# Patient Record
Sex: MALE | Race: WHITE | NOT HISPANIC OR LATINO | Employment: FULL TIME | URBAN - METROPOLITAN AREA
[De-identification: names, ages, dates, MRNs, and addresses within clinical notes are randomized per-mention and may not be internally consistent; named-entity substitution may affect disease eponyms.]

---

## 2018-03-29 ENCOUNTER — OFFICE VISIT (OUTPATIENT)
Dept: FAMILY MEDICINE CLINIC | Facility: CLINIC | Age: 38
End: 2018-03-29
Payer: COMMERCIAL

## 2018-03-29 ENCOUNTER — TRANSCRIBE ORDERS (OUTPATIENT)
Dept: FAMILY MEDICINE CLINIC | Facility: CLINIC | Age: 38
End: 2018-03-29

## 2018-03-29 VITALS
DIASTOLIC BLOOD PRESSURE: 70 MMHG | RESPIRATION RATE: 16 BRPM | BODY MASS INDEX: 32.34 KG/M2 | SYSTOLIC BLOOD PRESSURE: 130 MMHG | HEIGHT: 71 IN | HEART RATE: 80 BPM | TEMPERATURE: 98.1 F | WEIGHT: 231 LBS

## 2018-03-29 DIAGNOSIS — R05.9 COUGH: ICD-10-CM

## 2018-03-29 DIAGNOSIS — F41.9 ANXIETY: ICD-10-CM

## 2018-03-29 DIAGNOSIS — J32.9 SINUSITIS, UNSPECIFIED CHRONICITY, UNSPECIFIED LOCATION: Primary | ICD-10-CM

## 2018-03-29 PROCEDURE — 99203 OFFICE O/P NEW LOW 30 MIN: CPT | Performed by: NURSE PRACTITIONER

## 2018-03-29 RX ORDER — ALPRAZOLAM 0.5 MG/1
TABLET ORAL
Qty: 10 TABLET | Refills: 0 | Status: SHIPPED | OUTPATIENT
Start: 2018-03-29 | End: 2018-05-09 | Stop reason: SDUPTHER

## 2018-03-29 RX ORDER — ALBUTEROL SULFATE 90 UG/1
2 AEROSOL, METERED RESPIRATORY (INHALATION) EVERY 6 HOURS PRN
Qty: 1 INHALER | Refills: 0 | Status: SHIPPED | OUTPATIENT
Start: 2018-03-29 | End: 2018-05-09

## 2018-03-29 RX ORDER — DOXYCYCLINE 100 MG/1
100 TABLET ORAL 2 TIMES DAILY
Qty: 20 TABLET | Refills: 0 | Status: SHIPPED | OUTPATIENT
Start: 2018-03-29 | End: 2018-04-08

## 2018-03-29 RX ORDER — FLUTICASONE PROPIONATE 50 MCG
1 SPRAY, SUSPENSION (ML) NASAL DAILY
Qty: 16 G | Refills: 0 | Status: SHIPPED | OUTPATIENT
Start: 2018-03-29 | End: 2018-05-09

## 2018-03-29 NOTE — PATIENT INSTRUCTIONS
Fluids  Rest  Nasal saline  Supportive care for symptom management  Flonase as directed  Doxycyline 100mg twice daily for 10 days  Hycodan syrup as needed for cough  Rescue inhaler , 2 puffs every 4-6 hours as needed for shortness breath, chest tightness, bronchospasm, coughing fits  Xanax 0 5mg daily as needed  Additional prescriptions will be managed by PCP  Symptoms may persist for 7-10 days

## 2018-03-29 NOTE — PROGRESS NOTES
Assessment/Plan:      1  Sinusitis, unspecified chronicity, unspecified location  Fluids  Rest  Nasal saline  Supportive care for symptom management  - doxycycline (ADOXA) 100 MG tablet; Take 1 tablet (100 mg total) by mouth 2 (two) times a day for 10 days  Dispense: 20 tablet; Refill: 0  - fluticasone (FLONASE) 50 mcg/act nasal spray; 1 spray into each nostril daily  Dispense: 16 g; Refill: 0    2  Cough  May be secondary to pnd vs post viral reactive cough  - HYDROcodone-homatropine (HYCODAN) 5-1 5 mg/5 mL syrup; Take 5 mL by mouth 4 (four) times a day as needed for cough Max Daily Amount: 20 mL  Dispense: 120 mL; Refill: 0  - albuterol (PROVENTIL HFA,VENTOLIN HFA) 90 mcg/act inhaler; Inhale 2 puffs every 6 (six) hours as needed for wheezing  Dispense: 1 Inhaler; Refill: 0    3  Anxiety  Advised that further rx will be managed by pcp  Pt verbalized understanding    - ALPRAZolam (XANAX) 0 5 mg tablet; Daily prn  Dispense: 10 tablet; Refill: 0      Subjective:  I have reviewed past medical history, surgical history, medications, allergies, family history, and social history  Patient ID: Hollis Landaverde is a 34 y o  male who presents with cough and congestion  Symptoms ongoing for January  Diagnosed with flu in January  Cough for months  Zpack prescribed 2 weeks ago- teledoc  Been taking dayquil, nyquil  Cough drops  No fevers  Burns in chest  Everyone at home with same symptoms  Cough worsening, green/brown sputum and nasal discharge  Foul taste in mouth, tastes like blood  Feels like zpack was helping and symptoms lingering  Also wanted to request a rx for xanax until he establishes and has physical   Usually takes one tab 2-3 times per week as needed and was prescribed by oncologist during his cancer treatment  Feels a little overwhelmed right now as both parents are in the hospital in [place], has to fly there later today unexpectedly to deal with some issues           Review of Systems Constitutional: Positive for fatigue  Negative for chills and fever  HENT: Positive for congestion and postnasal drip  Respiratory: Positive for cough, chest tightness, shortness of breath (mostly with coughing fits) and wheezing  Gastrointestinal: Negative for nausea and vomiting  Skin: Negative for rash  Neurological: Positive for headaches  Negative for dizziness  Hematological: Negative for adenopathy  Psychiatric/Behavioral: The patient is nervous/anxious (intermittent issues with anxiety)  Objective:     Physical Exam   Constitutional: He is oriented to person, place, and time  He appears well-developed and well-nourished  No distress  HENT:   Head: Normocephalic and atraumatic  TMS WNL  Turbinates inflamed and edematous  Oropharynx with no erythema or exudate    (+) maxillary sinus tenderness to palpation    (+) PND     Eyes: Right eye exhibits no discharge  Left eye exhibits no discharge  Neck: Normal range of motion  Neck supple  Cardiovascular: Normal rate and regular rhythm  No murmur heard  Pulmonary/Chest: Effort normal and breath sounds normal  No respiratory distress  He has no wheezes  He has no rales  Abdominal: Soft  Bowel sounds are normal  He exhibits no distension  There is no tenderness  Musculoskeletal: Normal range of motion  Lymphadenopathy:     He has no cervical adenopathy  Neurological: He is alert and oriented to person, place, and time  Skin: Skin is warm and dry  Psychiatric: He has a normal mood and affect   His behavior is normal  Thought content normal

## 2018-04-24 ENCOUNTER — APPOINTMENT (OUTPATIENT)
Dept: RADIOLOGY | Facility: CLINIC | Age: 38
End: 2018-04-24
Payer: COMMERCIAL

## 2018-04-24 ENCOUNTER — OFFICE VISIT (OUTPATIENT)
Dept: OBGYN CLINIC | Facility: CLINIC | Age: 38
End: 2018-04-24
Payer: COMMERCIAL

## 2018-04-24 VITALS
BODY MASS INDEX: 32.56 KG/M2 | HEIGHT: 71 IN | HEART RATE: 69 BPM | DIASTOLIC BLOOD PRESSURE: 86 MMHG | WEIGHT: 232.6 LBS | SYSTOLIC BLOOD PRESSURE: 121 MMHG

## 2018-04-24 DIAGNOSIS — M25.512 LEFT SHOULDER PAIN, UNSPECIFIED CHRONICITY: ICD-10-CM

## 2018-04-24 DIAGNOSIS — M25.512 LEFT SHOULDER PAIN, UNSPECIFIED CHRONICITY: Primary | ICD-10-CM

## 2018-04-24 PROCEDURE — 99203 OFFICE O/P NEW LOW 30 MIN: CPT | Performed by: ORTHOPAEDIC SURGERY

## 2018-04-24 PROCEDURE — 73030 X-RAY EXAM OF SHOULDER: CPT

## 2018-04-24 NOTE — PROGRESS NOTES
Assessment/Plan:  1  Left shoulder pain, unspecified chronicity  XR shoulder 2+ vw left     I am concerned about a rotator cuff tear and thus am ordering an MRI to rule this out  We will see him back after the MRI to discuss the results and how we will proceed  Chemotherapy is know to weaken tendons and thus he is at higher risk for this type of tear  Subjective:   Regina Ambriz is a 34 y o  male who presents today with pain about the left shoulder which began a couple years ago with no inciting event prior to the onset of his symptoms  He states that this has gotten progressively worse over time  Most of his pain is about the lateral and posterolateral aspect of the shoulder and is worse with abduction of the shoulder and reaching for things  Rest helps  He has done a home exercise program for this since January, which has not helped  He notes weakness about the shoulder as well  He does have a history of lymphoma and had chemotherapy and radiation 3 years ago  Review of Systems   Constitutional: Negative for chills, fever and unexpected weight change  HENT: Negative for hearing loss, nosebleeds and sore throat  Eyes: Negative for pain, redness and visual disturbance  Respiratory: Negative for cough, shortness of breath and wheezing  Cardiovascular: Negative for chest pain, palpitations and leg swelling  Gastrointestinal: Negative for abdominal pain, nausea and vomiting  Endocrine: Negative for polyphagia and polyuria  Genitourinary: Negative for dysuria and hematuria  Musculoskeletal:        See HPI   Skin: Negative for rash and wound  Neurological: Negative for dizziness, numbness and headaches  Psychiatric/Behavioral: Negative for decreased concentration and suicidal ideas  The patient is not nervous/anxious            Past Medical History:   Diagnosis Date    Hodgkin's lymphoma [facility])        Past Surgical History:   Procedure Laterality Date    APPENDECTOMY         History reviewed  No pertinent family history  Social History     Occupational History    Not on file  Social History Main Topics    Smoking status: Former Smoker    Smokeless tobacco: Never Used    Alcohol use 1 2 oz/week     2 Cans of beer per week    Drug use: No    Sexual activity: Not on file         Current Outpatient Prescriptions:     ALPRAZolam (XANAX) 0 5 mg tablet, Daily prn, Disp: 10 tablet, Rfl: 0    fluticasone (FLONASE) 50 mcg/act nasal spray, 1 spray into each nostril daily, Disp: 16 g, Rfl: 0    albuterol (PROVENTIL HFA,VENTOLIN HFA) 90 mcg/act inhaler, Inhale 2 puffs every 6 (six) hours as needed for wheezing, Disp: 1 Inhaler, Rfl: 0    HYDROcodone-homatropine (HYCODAN) 5-1 5 mg/5 mL syrup, Take 5 mL by mouth 4 (four) times a day as needed for cough Max Daily Amount: 20 mL, Disp: 120 mL, Rfl: 0    No Known Allergies    Objective:  Vitals:    04/24/18 1452   BP: 121/86   Pulse: 69       Left Shoulder Exam     Tenderness   The patient is experiencing no tenderness  Range of Motion   Active Abduction: 150   Forward Flexion: 160   Internal Rotation 0 degrees: L5     Muscle Strength   Abduction: 4/5   Internal Rotation: 5/5   External Rotation: 4/5     Tests   Drop Arm: negative  Hawkin's test: positive  Impingement: positive    Other   Erythema: absent  Sensation: normal  Pulse: present     Comments:  Positive empty can test              Physical Exam   Constitutional: He is oriented to person, place, and time  He appears well-developed  HENT:   Head: Normocephalic and atraumatic  Eyes: Conjunctivae are normal    Neck: Neck supple  Cardiovascular: Intact distal pulses  Pulmonary/Chest: Effort normal    Abdominal: Soft  Neurological: He is alert and oriented to person, place, and time  Skin: Skin is warm and dry  Psychiatric: He has a normal mood and affect  His behavior is normal    Vitals reviewed        I have personally reviewed pertinent films in PACS and my interpretation is as follows:  Xrays left

## 2018-05-09 ENCOUNTER — OFFICE VISIT (OUTPATIENT)
Dept: FAMILY MEDICINE CLINIC | Facility: CLINIC | Age: 38
End: 2018-05-09
Payer: COMMERCIAL

## 2018-05-09 VITALS
WEIGHT: 233 LBS | TEMPERATURE: 97.4 F | HEIGHT: 71 IN | BODY MASS INDEX: 32.62 KG/M2 | SYSTOLIC BLOOD PRESSURE: 130 MMHG | HEART RATE: 72 BPM | DIASTOLIC BLOOD PRESSURE: 82 MMHG | RESPIRATION RATE: 16 BRPM

## 2018-05-09 DIAGNOSIS — Z00.00 ROUTINE PHYSICAL EXAMINATION: Primary | ICD-10-CM

## 2018-05-09 DIAGNOSIS — F41.9 ANXIETY: ICD-10-CM

## 2018-05-09 DIAGNOSIS — Z92.3 HISTORY OF RADIATION THERAPY: ICD-10-CM

## 2018-05-09 DIAGNOSIS — G43.109 OCULAR MIGRAINE: ICD-10-CM

## 2018-05-09 PROBLEM — Z85.71 HISTORY OF HODGKIN'S LYMPHOMA: Status: ACTIVE | Noted: 2018-05-09

## 2018-05-09 PROBLEM — Z87.19 HISTORY OF FATTY INFILTRATION OF LIVER: Status: ACTIVE | Noted: 2018-05-09

## 2018-05-09 PROBLEM — Z90.49 HISTORY OF APPENDECTOMY: Status: ACTIVE | Noted: 2018-05-09

## 2018-05-09 PROCEDURE — 99396 PREV VISIT EST AGE 40-64: CPT | Performed by: NURSE PRACTITIONER

## 2018-05-09 RX ORDER — ALPRAZOLAM 0.5 MG/1
TABLET ORAL
Qty: 10 TABLET | Refills: 0 | Status: SHIPPED | OUTPATIENT
Start: 2018-05-09 | End: 2018-12-14 | Stop reason: SDUPTHER

## 2018-05-09 RX ORDER — ASPIRIN 81 MG/1
81 TABLET, CHEWABLE ORAL DAILY
Qty: 100 TABLET | Refills: 3 | Status: SHIPPED | OUTPATIENT
Start: 2018-05-09 | End: 2019-12-17 | Stop reason: ALTCHOICE

## 2018-05-09 NOTE — PATIENT INSTRUCTIONS
Jefe De La Garza and regular exercise  Complete her blood work that is a 12 hour fast   Return 7 to 10 days after that is complete to review the findings

## 2018-05-09 NOTE — PROGRESS NOTES
Chief Complaint   Patient presents with    Follow-up     establish new PCP         Patient ID: Colten Moore is a 34 y o  male  Jacques Mendieta is a 78-year-old male here for routine physical exam to establish with the practice today  He states he is basically feeling well at the time of the visit but is frustrated with his weight loss resistance that has been present for several months now  He has a significant past medical history of Hodgkin's lymphoma for which he was treated with chemo and chest radiation  He also states that his lymphoma was diagnosed at age 38 he was successfully treated by Dr Kj Peters in [facility]  He does continue his annual follow-ups with her host treatment to remission at this time  He also reports history of frequent migraines as a child that have reduced in frequency and severity as he has become an adult  At this time he reports he has visual symptoms that are described as scintillating scotomas that occur once every two months or so  The visual symptoms are not associated with a headache any more  He also reports more recently having issues with situational anxiety for which he gets symptomatic relief with Xanax once daily as needed  He states he does not use it daily and it is only needed when he is in a very high anxiety situation due to his father recently being admitted to hospice for terminal care and his mother currently being in the hospital for complications post cancer surgery  He is concerned his mother will also be transferred to hospice care as well  He denies suicidal or homicidal ideation  He denies sleep disorder  Patient is adopted and there is no known genetic family history  Patient denies any history of depression or anxiety or psychiatric evaluation  His current situation is described as situational due to the stress factors as described above with his parents current illness          Past Medical History:   Diagnosis Date    Hodgkin's lymphoma ([facility] [address] ) Past Surgical History:   Procedure Laterality Date    APPENDECTOMY         Patient Active Problem List   Diagnosis    Routine physical examination    Anxiety    History of radiation therapy    Ocular migraine    History of Hodgkin's lymphoma    History of fatty infiltration of liver    History of appendectomy       History reviewed  No pertinent family history  There is no immunization history on file for this patient  No Known Allergies    Current Outpatient Prescriptions   Medication Sig Dispense Refill    ALPRAZolam (XANAX) 0 5 mg tablet Daily prn 10 tablet 0    aspirin 81 mg chewable tablet Chew 1 tablet (81 mg total) daily 100 tablet 3     No current facility-administered medications for this visit  Social History     Social History    Marital status: /Civil Union     Spouse name: N/A    Number of children: N/A    Years of education: N/A     Social History Main Topics    Smoking status: Former Smoker    Smokeless tobacco: Never Used    Alcohol use 1 2 oz/week     2 Cans of beer per week    Drug use: No    Sexual activity: Not Asked     Other Topics Concern    None     Social History Narrative    None       Review of Systems   Constitutional: Negative  HENT: Negative  Eyes: Negative  Respiratory: Negative  Cardiovascular: Negative  Gastrointestinal: Negative  Endocrine: Negative  Genitourinary: Negative  Musculoskeletal: Negative  Skin: Negative  Allergic/Immunologic: Negative  Neurological: Negative  Hematological: Negative  Psychiatric/Behavioral: Negative  Objective:    /82 (BP Location: Right arm, Patient Position: Sitting, Cuff Size: Standard)   Pulse 72   Temp (!) 97 4 °F (36 3 °C)   Resp 16   Ht 5' 11" (1 803 m)   Wt 106 kg (233 lb)   BMI 32 50 kg/m²        Physical Exam   Constitutional: He is oriented to person, place, and time  He appears well-developed and well-nourished  No distress     HENT: Head: Normocephalic and atraumatic  Right Ear: External ear normal    Left Ear: External ear normal    Nose: Nose normal    Mouth/Throat: Oropharynx is clear and moist  No oropharyngeal exudate  Eyes: Conjunctivae and EOM are normal  Pupils are equal, round, and reactive to light  No scleral icterus  Neck: Normal range of motion  Neck supple  No JVD present  No thyromegaly present  Cardiovascular: Normal rate and normal heart sounds  Pulmonary/Chest: Effort normal and breath sounds normal    Abdominal: Soft  Bowel sounds are normal  He exhibits no distension and no mass  There is no tenderness  Genitourinary:   Genitourinary Comments: Scrotal contents: Normal testes, no masses  Inguinal canal clear bilaterally  Penis: Normal, no lesions  Musculoskeletal: Normal range of motion  He exhibits no edema or deformity  Lymphadenopathy:     He has no cervical adenopathy  Neurological: He is alert and oriented to person, place, and time  He has normal reflexes  No cranial nerve deficit  He exhibits normal muscle tone  Coordination normal    Skin: Skin is warm and dry  No rash noted  No erythema  Psychiatric: He has a normal mood and affect  His behavior is normal  Judgment and thought content normal              Assessment/Plan:    No problem-specific Assessment & Plan notes found for this encounter  Diagnoses and all orders for this visit:    Routine physical examination  Comments:  Complete labs  Orders:  -     ALPRAZolam (XANAX) 0 5 mg tablet; Daily prn  -     Comprehensive metabolic panel; Future  -     CBC and differential; Future  -     Lipid panel; Future  -     TSH+Free T4; Future  -     TSH, 3rd generation; Future    Anxiety  Comments:  Stable with current Xanax therapy  Orders:  -     ALPRAZolam (XANAX) 0 5 mg tablet; Daily prn    History of radiation therapy  Comments:  See notation relative to scatter and concern for thyroid impact    Orders:  -     CBC and differential; Future  - TSH+Free T4; Future  -     TSH, 3rd generation; Future    Ocular migraine  Comments:  See notation to begin daily aspirin  Patient agrees with plan  He will keep me apprised of the severity and frequency of these occurrences  Orders:  -     aspirin 81 mg chewable tablet; Chew 1 tablet (81 mg total) daily            Patient Instructions   [address] and regular exercise  Complete her blood work that is a 12 hour fast   Return 7 to 10 days after that is complete to review the findings                      Leo Jimenes

## 2018-05-30 ENCOUNTER — TELEPHONE (OUTPATIENT)
Dept: FAMILY MEDICINE CLINIC | Facility: CLINIC | Age: 38
End: 2018-05-30

## 2018-05-30 NOTE — TELEPHONE ENCOUNTER
Message left for patient to call office  Patient has appointment on 5/31 with Hugh Huynh to review labs  Asked patient when and where labs were drawn   gina/lpn

## 2018-12-14 DIAGNOSIS — F41.9 ANXIETY: ICD-10-CM

## 2018-12-14 DIAGNOSIS — Z00.00 ROUTINE PHYSICAL EXAMINATION: ICD-10-CM

## 2018-12-15 DIAGNOSIS — Z00.00 ROUTINE PHYSICAL EXAMINATION: ICD-10-CM

## 2018-12-15 DIAGNOSIS — F41.9 ANXIETY: ICD-10-CM

## 2018-12-15 RX ORDER — ALPRAZOLAM 0.5 MG/1
TABLET ORAL
Qty: 10 TABLET | Refills: 0 | Status: SHIPPED | OUTPATIENT
Start: 2018-12-15

## 2019-12-17 ENCOUNTER — OFFICE VISIT (OUTPATIENT)
Dept: URGENT CARE | Facility: CLINIC | Age: 39
End: 2019-12-17
Payer: COMMERCIAL

## 2019-12-17 VITALS
TEMPERATURE: 98.3 F | SYSTOLIC BLOOD PRESSURE: 116 MMHG | WEIGHT: 243 LBS | RESPIRATION RATE: 16 BRPM | OXYGEN SATURATION: 100 % | BODY MASS INDEX: 33.89 KG/M2 | HEART RATE: 82 BPM | DIASTOLIC BLOOD PRESSURE: 78 MMHG

## 2019-12-17 DIAGNOSIS — J40 BRONCHITIS: Primary | ICD-10-CM

## 2019-12-17 PROCEDURE — 99213 OFFICE O/P EST LOW 20 MIN: CPT | Performed by: PHYSICIAN ASSISTANT

## 2019-12-17 RX ORDER — ALBUTEROL SULFATE 90 UG/1
2 AEROSOL, METERED RESPIRATORY (INHALATION) EVERY 6 HOURS PRN
Qty: 1 INHALER | Refills: 0 | Status: SHIPPED | OUTPATIENT
Start: 2019-12-17 | End: 2020-01-14

## 2019-12-17 RX ORDER — BENZONATATE 100 MG/1
200 CAPSULE ORAL 3 TIMES DAILY PRN
Qty: 30 CAPSULE | Refills: 0 | Status: SHIPPED | OUTPATIENT
Start: 2019-12-17 | End: 2020-09-01

## 2019-12-17 RX ORDER — METHYLPREDNISOLONE 4 MG/1
TABLET ORAL
Qty: 1 EACH | Refills: 0 | Status: SHIPPED | OUTPATIENT
Start: 2019-12-17 | End: 2020-09-01

## 2019-12-17 RX ORDER — ZOLPIDEM TARTRATE 10 MG/1
TABLET ORAL
Refills: 3 | COMMUNITY
Start: 2019-12-04

## 2019-12-17 NOTE — PROGRESS NOTES
[facility]        NAME: Jeremi Lopez is a 37 y o  male  : 1980    MRN: [id number]  DATE: 2019  TIME: 2:52 PM    Assessment and Plan   Bronchitis [J40]  1  Bronchitis  albuterol (PROVENTIL HFA,VENTOLIN HFA) 90 mcg/act inhaler    methylPREDNISolone 4 MG tablet therapy pack    benzonatate (TESSALON PERLES) 100 mg capsule         Patient Instructions   Acute Bronchitis:   -No sign of a bacterial infection on exam    -There is mild diffuse wheezing and course breath sounds heard on exam  Will treat the patient with Medrol dose marcos to aid in reduction of the inflammation  Take with meals    -Albuterol inhaler for the wheezing and chest tightness as needed    -Tessalon perles for the cough  -Run a humidifier next to your bed  Take steam showers  -Stay well hydrated and rest   -Follow up with your PCP immediately if your sx worsen or do not resolve  If you experience dyspnea, dizziness, weakness, fever go to the ED  Follow up with PCP in 3-5 days  Proceed to  ER if symptoms worsen  Chief Complaint     Chief Complaint   Patient presents with    Cold Like Symptoms     pt presents with cough, bodyaches; started yesterday         History of Present Illness       The patient presents today for a one day hx of cough and myalgias  The patient states that his daughter has been ill with cough and rhinorrhea x 3 weeks and is not improving, he feels that he got this illness from her  He states that his sx began with myalgias and a productive cough of a green sputum  He states that he is experiencing chest tightness  He states that he is also experiencing wheezing  He denies fever, chills, dyspnea, cp, palpitations, dizziness, weakness  He has been increasing his fluid intake  He denies recent travel  He denies a hx of asthma or smoking  Review of Systems   Review of Systems   Constitutional: Negative for activity change, appetite change, chills, diaphoresis, fatigue and fever     HENT: Positive for congestion, postnasal drip and rhinorrhea  Negative for ear discharge, ear pain, facial swelling, hearing loss, sinus pressure, sinus pain, sneezing, sore throat, tinnitus, trouble swallowing and voice change  Respiratory: Positive for cough, chest tightness and wheezing  Negative for shortness of breath and stridor  Cardiovascular: Negative for chest pain, palpitations and leg swelling  Gastrointestinal: Negative for abdominal pain, diarrhea, nausea and vomiting  Musculoskeletal: Positive for myalgias  Negative for arthralgias, joint swelling, neck pain and neck stiffness  Skin: Negative for rash  Allergic/Immunologic: Negative for immunocompromised state  Neurological: Negative for dizziness, weakness, light-headedness, numbness and headaches  Hematological: Negative for adenopathy           Current Medications       Current Outpatient Medications:     ALPRAZolam (XANAX) 0 5 mg tablet, Daily prn, Disp: 10 tablet, Rfl: 0    zolpidem (AMBIEN) 10 mg tablet, TK 1 T PO HS IF NEEDED FOR SLEEP, Disp: , Rfl: 3    albuterol (PROVENTIL HFA,VENTOLIN HFA) 90 mcg/act inhaler, Inhale 2 puffs every 6 (six) hours as needed for wheezing or shortness of breath, Disp: 1 Inhaler, Rfl: 0    benzonatate (TESSALON PERLES) 100 mg capsule, Take 2 capsules (200 mg total) by mouth 3 (three) times a day as needed for cough, Disp: 30 capsule, Rfl: 0    methylPREDNISolone 4 MG tablet therapy pack, Use as directed on package, Disp: 1 each, Rfl: 0    Current Allergies     Allergies as of 12/17/2019    (No Known Allergies)            The following portions of the patient's history were reviewed and updated as appropriate: allergies, current medications, past family history, past medical history, past social history, past surgical history and problem list      Past Medical History:   Diagnosis Date    Hodgkin's lymphoma ([facility] [address] )     Sleep disorder     trouble falling asleep and staying asleep       Past Surgical History:   Procedure Laterality Date    APPENDECTOMY      THORACOSCOPY VIDEO ASSISTED SURGERY (VATS)         Family History   Adopted: Yes         Medications have been verified  Objective   /78   Pulse 82   Temp 98 3 °F (36 8 °C)   Resp 16   Wt 110 kg (243 lb)   SpO2 100%   BMI 33 89 kg/m²        Physical Exam     Physical Exam   Constitutional: He is oriented to person, place, and time  He appears well-developed and well-nourished  No distress  HENT:   Head: Normocephalic and atraumatic  Right Ear: Hearing, tympanic membrane, external ear and ear canal normal    Left Ear: Hearing, tympanic membrane, external ear and ear canal normal    Nose: Nose normal  No mucosal edema or rhinorrhea  Right sinus exhibits no maxillary sinus tenderness and no frontal sinus tenderness  Left sinus exhibits no maxillary sinus tenderness and no frontal sinus tenderness  Mouth/Throat: Uvula is midline, oropharynx is clear and moist and mucous membranes are normal  No uvula swelling  No oropharyngeal exudate, posterior oropharyngeal edema, posterior oropharyngeal erythema or tonsillar abscesses  Neck: Normal range of motion  Neck supple  Cardiovascular: Normal rate, regular rhythm, S1 normal and S2 normal  Exam reveals no gallop, no S3, no S4, no distant heart sounds and no friction rub  No murmur heard  Pulmonary/Chest: No accessory muscle usage or stridor  No tachypnea and no bradypnea  No respiratory distress  He has no decreased breath sounds  He has wheezes  He has no rhonchi  He has no rales  Mild diffuse wheezing and course breath sounds    Lymphadenopathy:     He has no cervical adenopathy  Neurological: He is alert and oriented to person, place, and time  Skin: He is not diaphoretic  Psychiatric: He has a normal mood and affect  His behavior is normal    Nursing note and vitals reviewed

## 2019-12-17 NOTE — PATIENT INSTRUCTIONS
Acute Bronchitis:   -No sign of a bacterial infection on exam    -There is mild diffuse wheezing and course breath sounds heard on exam  Will treat the patient with Medrol dose marcos to aid in reduction of the inflammation  Take with meals    -Albuterol inhaler for the wheezing and chest tightness as needed    -Tessalon perles for the cough  -Run a humidifier next to your bed  Take steam showers  -Stay well hydrated and rest   -Follow up with your PCP immediately if your sx worsen or do not resolve  If you experience dyspnea, dizziness, weakness, fever go to the ED

## 2020-01-09 ENCOUNTER — APPOINTMENT (OUTPATIENT)
Dept: RADIOLOGY | Facility: CLINIC | Age: 40
End: 2020-01-09
Attending: FAMILY MEDICINE
Payer: COMMERCIAL

## 2020-01-09 ENCOUNTER — OFFICE VISIT (OUTPATIENT)
Dept: URGENT CARE | Facility: CLINIC | Age: 40
End: 2020-01-09
Payer: COMMERCIAL

## 2020-01-09 VITALS
SYSTOLIC BLOOD PRESSURE: 114 MMHG | HEART RATE: 81 BPM | DIASTOLIC BLOOD PRESSURE: 78 MMHG | BODY MASS INDEX: 33.61 KG/M2 | RESPIRATION RATE: 18 BRPM | TEMPERATURE: 98.4 F | OXYGEN SATURATION: 100 % | WEIGHT: 241 LBS

## 2020-01-09 DIAGNOSIS — J01.40 ACUTE PANSINUSITIS, RECURRENCE NOT SPECIFIED: Primary | ICD-10-CM

## 2020-01-09 DIAGNOSIS — R05.9 COUGH: ICD-10-CM

## 2020-01-09 PROCEDURE — 99213 OFFICE O/P EST LOW 20 MIN: CPT | Performed by: FAMILY MEDICINE

## 2020-01-09 PROCEDURE — 71046 X-RAY EXAM CHEST 2 VIEWS: CPT

## 2020-01-09 RX ORDER — FLUTICASONE PROPIONATE 50 MCG
1 SPRAY, SUSPENSION (ML) NASAL DAILY
Qty: 1 BOTTLE | Refills: 0 | Status: SHIPPED | OUTPATIENT
Start: 2020-01-09 | End: 2020-09-01

## 2020-01-09 RX ORDER — BENZONATATE 200 MG/1
200 CAPSULE ORAL 3 TIMES DAILY PRN
Qty: 20 CAPSULE | Refills: 0 | Status: SHIPPED | OUTPATIENT
Start: 2020-01-09 | End: 2020-09-01

## 2020-01-09 RX ORDER — AMOXICILLIN AND CLAVULANATE POTASSIUM 875; 125 MG/1; MG/1
1 TABLET, FILM COATED ORAL EVERY 12 HOURS SCHEDULED
Qty: 20 TABLET | Refills: 0 | Status: SHIPPED | OUTPATIENT
Start: 2020-01-09 | End: 2020-01-19

## 2020-01-09 NOTE — PATIENT INSTRUCTIONS
1  Acute Sinusitis  - CXR shows no acute abnormalities   - Augmentin prescribed, complete as directed   - Flonase nasal spray prescribed, use as directed   - Tessalon pearls refilled to help w/ cough, use as directed   - take Tylenol or Motrin as needed   - drink plenty of fluids and run a humidifier at home   - try warm salt water gargles and throat lozenges as needed   - if symptoms persist despite treatment or worsen, follow up w/ PCP for re-check or proceed to the ER

## 2020-01-09 NOTE — PROGRESS NOTES
[facility]        NAME: Dayna Palmer is a 37 y o  male  : 1980    MRN: [id number]  DATE: 2020  TIME: 2:24 PM    Assessment and Plan   Acute pansinusitis, recurrence not specified [J01 40]  1  Acute pansinusitis, recurrence not specified  XR chest pa & lateral    amoxicillin-clavulanate (AUGMENTIN) 875-125 mg per tablet    benzonatate (TESSALON) 200 MG capsule    fluticasone (FLONASE) 50 mcg/act nasal spray         Patient Instructions     Patient Instructions   1  Acute Sinusitis  - CXR shows no acute abnormalities   - Augmentin prescribed, complete as directed   - Flonase nasal spray prescribed, use as directed   - Tessalon pearls refilled to help w/ cough, use as directed   - take Tylenol or Motrin as needed   - drink plenty of fluids and run a humidifier at home   - try warm salt water gargles and throat lozenges as needed   - if symptoms persist despite treatment or worsen, follow up w/ PCP for re-check or proceed to the ER       Follow up with PCP in 3-5 days  Proceed to  ER if symptoms worsen  Chief Complaint     Chief Complaint   Patient presents with    Cold Like Symptoms     pt return from last office visit; cough worsening, sinus pressure         History of Present Illness       38 yo male, was seen in our office on 2019 for a cough and diagnosed with an acute viral bronchitis  He was prescribed Medrol dose pack, Tessalon pearls, and an Albuterol inhaler  He states he completed the steroid, has been using the inhaler as needed, and has been taking the Tessalon pearls for the cough but ran out  He states he feels his symptoms are progressively getting worse  He continues to c/o of a cough which is now productive  He is also experiencing sinus pressure, nasal congestion, rhinorrhea, ear pressure, PND, and sore throat  No fever/chills  No headache or body aches  No chest pain, SOB, or wheezing  Non-smoker  No hx of asthma or COPD  No GI sx  No skin rashes   No recent travel or known sick contacts  In addition to the prescribed medications, he has been taking Dayquil, Nyquil, and Mucinex-DM as needed  He has not been seen by his PCP or the ER between the two office visits  Review of Systems   Review of Systems   Constitutional: Negative  HENT:        As noted in HPI   Eyes: Negative  Respiratory:        As noted in HPI   Cardiovascular: Negative  Gastrointestinal: Negative  Musculoskeletal: Negative  Skin: Negative  Neurological: Negative            Current Medications       Current Outpatient Medications:     ALPRAZolam (XANAX) 0 5 mg tablet, Daily prn, Disp: 10 tablet, Rfl: 0    zolpidem (AMBIEN) 10 mg tablet, TK 1 T PO HS IF NEEDED FOR SLEEP, Disp: , Rfl: 3    albuterol (PROVENTIL HFA,VENTOLIN HFA) 90 mcg/act inhaler, Inhale 2 puffs every 6 (six) hours as needed for wheezing or shortness of breath (Patient not taking: Reported on 1/9/2020), Disp: 1 Inhaler, Rfl: 0    amoxicillin-clavulanate (AUGMENTIN) 875-125 mg per tablet, Take 1 tablet by mouth every 12 (twelve) hours for 10 days, Disp: 20 tablet, Rfl: 0    benzonatate (TESSALON PERLES) 100 mg capsule, Take 2 capsules (200 mg total) by mouth 3 (three) times a day as needed for cough, Disp: 30 capsule, Rfl: 0    benzonatate (TESSALON) 200 MG capsule, Take 1 capsule (200 mg total) by mouth 3 (three) times a day as needed for cough, Disp: 20 capsule, Rfl: 0    fluticasone (FLONASE) 50 mcg/act nasal spray, 1 spray into each nostril daily, Disp: 1 Bottle, Rfl: 0    methylPREDNISolone 4 MG tablet therapy pack, Use as directed on package (Patient not taking: Reported on 1/9/2020), Disp: 1 each, Rfl: 0    Current Allergies     Allergies as of 01/09/2020    (No Known Allergies)            The following portions of the patient's history were reviewed and updated as appropriate: allergies, current medications, past family history, past medical history, past social history, past surgical history and problem list      Past Medical History:   Diagnosis Date    Hodgkin's lymphoma ([facility] [address] )     Sleep disorder     trouble falling asleep and staying asleep       Past Surgical History:   Procedure Laterality Date    APPENDECTOMY      THORACOSCOPY VIDEO ASSISTED SURGERY (VATS)         Family History   Adopted: Yes         Medications have been verified  Objective   /78   Pulse 81   Temp 98 4 °F (36 9 °C)   Resp 18   Wt 109 kg (241 lb)   SpO2 100%   BMI 33 61 kg/m²        Physical Exam     Physical Exam   Constitutional: He is oriented to person, place, and time  He appears well-developed and well-nourished  No distress  HENT:   Head: Normocephalic and atraumatic  Right Ear: Tympanic membrane, external ear and ear canal normal    Left Ear: Tympanic membrane, external ear and ear canal normal    Nose: Mucosal edema present  Right sinus exhibits maxillary sinus tenderness and frontal sinus tenderness  Left sinus exhibits maxillary sinus tenderness and frontal sinus tenderness  Mouth/Throat: Uvula is midline, oropharynx is clear and moist and mucous membranes are normal  No tonsillar exudate  Eyes: Conjunctivae and EOM are normal    Neck: Normal range of motion  Neck supple  Cardiovascular: Normal rate, regular rhythm and normal heart sounds  Pulmonary/Chest: Effort normal and breath sounds normal  No respiratory distress  Lymphadenopathy:     He has no cervical adenopathy  Neurological: He is alert and oriented to person, place, and time  Skin: He is not diaphoretic  Psychiatric: He has a normal mood and affect  His behavior is normal  Judgment and thought content normal    Nursing note and vitals reviewed

## 2020-01-14 DIAGNOSIS — J40 BRONCHITIS: ICD-10-CM

## 2020-01-14 RX ORDER — ALBUTEROL SULFATE 90 UG/1
AEROSOL, METERED RESPIRATORY (INHALATION)
Qty: 8.5 G | Refills: 0 | Status: SHIPPED | OUTPATIENT
Start: 2020-01-14 | End: 2020-09-01

## 2020-02-25 ENCOUNTER — TELEPHONE (OUTPATIENT)
Dept: FAMILY MEDICINE CLINIC | Facility: CLINIC | Age: 40
End: 2020-02-25

## 2020-03-03 NOTE — TELEPHONE ENCOUNTER
03/03/20 11:15 AM     Thank you for your request  Your request has been received, reviewed, and the patient chart updated  The PCP has successfully been removed with a patient attribution note  This message will now be completed      Thank you  Shane Chu

## 2020-09-01 ENCOUNTER — OFFICE VISIT (OUTPATIENT)
Dept: OBGYN CLINIC | Facility: CLINIC | Age: 40
End: 2020-09-01
Payer: COMMERCIAL

## 2020-09-01 VITALS
HEIGHT: 71 IN | DIASTOLIC BLOOD PRESSURE: 79 MMHG | BODY MASS INDEX: 33.12 KG/M2 | SYSTOLIC BLOOD PRESSURE: 124 MMHG | TEMPERATURE: 98.8 F | WEIGHT: 236.6 LBS | HEART RATE: 61 BPM

## 2020-09-01 DIAGNOSIS — S43.432A TEAR OF LEFT GLENOID LABRUM, INITIAL ENCOUNTER: ICD-10-CM

## 2020-09-01 DIAGNOSIS — M24.012 LOOSE BODY IN JOINT OF LEFT SHOULDER REGION: Primary | ICD-10-CM

## 2020-09-01 PROCEDURE — 99214 OFFICE O/P EST MOD 30 MIN: CPT | Performed by: ORTHOPAEDIC SURGERY

## 2020-09-01 RX ORDER — VALACYCLOVIR HYDROCHLORIDE 1 G/1
TABLET, FILM COATED ORAL
COMMUNITY
Start: 2020-08-16

## 2020-09-01 NOTE — H&P (VIEW-ONLY)
Assessment/Plan:  1  Loose body in joint of left shoulder region  Ambulatory referral to Physical Therapy    Comfort Arm Sling    Polar Care Cude w/Pad   2  Tear of left glenoid labrum, initial encounter  Ambulatory referral to Physical Therapy    Comfort Arm Sling    Polar Care Cude w/Pad       Scribe Attestation    I,:   Jose Montemayor am acting as a scribe while in the presence of the attending physician :        I,:   Dwight Hayden MD personally performed the services described in this documentation    as scribed in my presence :          MRI of the left shoulder demonstrate a tear to the superior labrum with a probable loose body and no indication of rotator cuff tear  He does demonstrate decent range of motion and strength globally about his shoulder  However, he does have a positive jerk maneuver, that is associated with popping about his shoulder, and a positive Waverly's testing  As this has been an ongoing problem for him that he has been suffering for numerous years, I did discuss surgical intervention in the form of a left shoulder arthroscopy, superior labral repair with possible biceps tenodesis and possible loose body removal   I explained in detail the surgery, risks and recovery  Risks of the surgery are inclusive of but not limited to bleeding, infection, nerve injury, blood clot, worsening of symptoms, not achieving the anticipated results, persistent stiffness, weakness and the need for additional surgery  He verbally stated he understood those risks and would like to proceed with the surgery  Consent forms were signed today in the office and 1 of our surgery schedulers will call him to place him on the schedule  He was fit for a postoperative sling today in the office  He will require routine labs, an EKG and a COVID-19 screening prior to his operation  At this time, we will see him on the day of his operation        Subjective:   Nomi Feliz is a 41 y o  male who presents to the office today for MRI follow up of the left shoulder  He was last seen in 2018 where we ordered an MRI questioning a rotator cuff tear  He states his symptoms are roughly unchanged from his previous visit  He localizes the majority of his pain along the posterior aspect of his shoulder  He describes his pain as activity related, achy, throbbing and mild-to-moderate in intensity  He does appreciate a significant amount of popping that is relatively painful about his shoulder during certain activities  He states activities such as cross arm maneuvers exacerbates his symptoms  He states he has seen a massage therapist numerous times, with no results  He denies any radicular symptoms  He denies any numbness and tingling  Review of Systems   Constitutional: Negative for chills, fever and unexpected weight change  HENT: Negative for hearing loss, nosebleeds and sore throat  Eyes: Negative for pain, redness and visual disturbance  Respiratory: Negative for cough, shortness of breath and wheezing  Cardiovascular: Negative for chest pain, palpitations and leg swelling  Gastrointestinal: Negative for abdominal pain, nausea and vomiting  Endocrine: Negative for polyphagia and polyuria  Genitourinary: Negative for dysuria and hematuria  Musculoskeletal:        See HPI   Skin: Negative for rash and wound  Neurological: Negative for dizziness, numbness and headaches  Psychiatric/Behavioral: Negative for decreased concentration and suicidal ideas  The patient is not nervous/anxious            Past Medical History:   Diagnosis Date    Hodgkin's lymphoma ([facility] [address] )     Sleep disorder     trouble falling asleep and staying asleep       Past Surgical History:   Procedure Laterality Date    APPENDECTOMY      THORACOSCOPY VIDEO ASSISTED SURGERY (VATS)         Family History   Adopted: Yes       Social History     Occupational History    Not on file   Tobacco Use    Smoking status: Former Smoker    Smokeless tobacco: Never Used   Substance and Sexual Activity    Alcohol use: Yes     Alcohol/week: 2 0 standard drinks     Types: 2 Cans of beer per week     Frequency: Monthly or less     Drinks per session: 1 or 2     Binge frequency: Never    Drug use: No    Sexual activity: Not on file         Current Outpatient Medications:     ALPRAZolam (XANAX) 0 5 mg tablet, Daily prn, Disp: 10 tablet, Rfl: 0    valACYclovir (VALTREX) 1,000 mg tablet, TK 2 TS PO BID FOR 1 DAY, Disp: , Rfl:     zolpidem (AMBIEN) 10 mg tablet, TK 1 T PO HS IF NEEDED FOR SLEEP, Disp: , Rfl: 3    No Known Allergies    Objective:  Vitals:    09/01/20 1201   BP: 124/79   Pulse: 61   Temp: 98 8 °F (37 1 °C)       Left Shoulder Exam     Tenderness   Left shoulder tenderness location: posterolateral aspect of shoulder  Range of Motion   Active abduction: 160   External rotation: 50   Forward flexion: 170   Internal rotation 0 degrees: L3     Muscle Strength   Abduction: 5/5   Internal rotation: 5/5   External rotation: 5/5   Biceps: 5/5     Other   Erythema: absent  Sensation: normal  Pulse: present (2+ radial)     Comments:    Jerk Maneuver (+) with associated popping  O'briens (+)            Physical Exam  Vitals signs and nursing note reviewed  Constitutional:       Appearance: He is well-developed  HENT:      Head: Normocephalic and atraumatic  Eyes:      Conjunctiva/sclera: Conjunctivae normal       Pupils: Pupils are equal, round, and reactive to light  Neck:      Musculoskeletal: Normal range of motion and neck supple  Cardiovascular:      Rate and Rhythm: Normal rate and regular rhythm  Pulses: Normal pulses  Heart sounds: Normal heart sounds  Pulmonary:      Effort: Pulmonary effort is normal  No respiratory distress  Breath sounds: Normal breath sounds  Musculoskeletal:      Comments: As noted in HPI   Skin:     General: Skin is warm and dry     Neurological:      Mental Status: He is alert and oriented to person, place, and time  Psychiatric:         Behavior: Behavior normal          I have personally reviewed pertinent films in PACS and my interpretation is as follows:  MRI of the left shoulder obtained on 04/26/2018 demonstrate a tear to the superior labral tear with probable loose body and no indication of rotator cuff tear

## 2020-09-01 NOTE — PROGRESS NOTES
Assessment/Plan:  1  Loose body in joint of left shoulder region  Ambulatory referral to Physical Therapy    Comfort Arm Sling    Polar Care Cude w/Pad   2  Tear of left glenoid labrum, initial encounter  Ambulatory referral to Physical Therapy    Comfort Arm Sling    Polar Care Cude w/Pad       Scribe Attestation    I,:   Arthurine Guardian am acting as a scribe while in the presence of the attending physician :        I,:   Chelle Petit MD personally performed the services described in this documentation    as scribed in my presence :          MRI of the left shoulder demonstrate a tear to the superior labrum with a probable loose body and no indication of rotator cuff tear  He does demonstrate decent range of motion and strength globally about his shoulder  However, he does have a positive jerk maneuver, that is associated with popping about his shoulder, and a positive Concepcion's testing  As this has been an ongoing problem for him that he has been suffering for numerous years, I did discuss surgical intervention in the form of a left shoulder arthroscopy, superior labral repair with possible biceps tenodesis and possible loose body removal   I explained in detail the surgery, risks and recovery  Risks of the surgery are inclusive of but not limited to bleeding, infection, nerve injury, blood clot, worsening of symptoms, not achieving the anticipated results, persistent stiffness, weakness and the need for additional surgery  He verbally stated he understood those risks and would like to proceed with the surgery  Consent forms were signed today in the office and 1 of our surgery schedulers will call him to place him on the schedule  He was fit for a postoperative sling today in the office  He will require routine labs, an EKG and a COVID-19 screening prior to his operation  At this time, we will see him on the day of his operation        Subjective:   Yves Mcelroy is a 41 y o  male who presents to the office today for MRI follow up of the left shoulder  He was last seen in 2018 where we ordered an MRI questioning a rotator cuff tear  He states his symptoms are roughly unchanged from his previous visit  He localizes the majority of his pain along the posterior aspect of his shoulder  He describes his pain as activity related, achy, throbbing and mild-to-moderate in intensity  He does appreciate a significant amount of popping that is relatively painful about his shoulder during certain activities  He states activities such as cross arm maneuvers exacerbates his symptoms  He states he has seen a massage therapist numerous times, with no results  He denies any radicular symptoms  He denies any numbness and tingling  Review of Systems   Constitutional: Negative for chills, fever and unexpected weight change  HENT: Negative for hearing loss, nosebleeds and sore throat  Eyes: Negative for pain, redness and visual disturbance  Respiratory: Negative for cough, shortness of breath and wheezing  Cardiovascular: Negative for chest pain, palpitations and leg swelling  Gastrointestinal: Negative for abdominal pain, nausea and vomiting  Endocrine: Negative for polyphagia and polyuria  Genitourinary: Negative for dysuria and hematuria  Musculoskeletal:        See HPI   Skin: Negative for rash and wound  Neurological: Negative for dizziness, numbness and headaches  Psychiatric/Behavioral: Negative for decreased concentration and suicidal ideas  The patient is not nervous/anxious            Past Medical History:   Diagnosis Date    Hodg[facility] ([facility] [address] )     Sleep disorder     trouble falling asleep and staying asleep       Past Surgical History:   Procedure Laterality Date    APPENDECTOMY      THORACOSCOPY VIDEO ASSISTED SURGERY (VATS)         Family History   Adopted: Yes       Social History     Occupational History    Not on file   Tobacco Use    Smoking status: Former Smoker    Smokeless tobacco: Never Used   Substance and Sexual Activity    Alcohol use: Yes     Alcohol/week: 2 0 standard drinks     Types: 2 Cans of beer per week     Frequency: Monthly or less     Drinks per session: 1 or 2     Binge frequency: Never    Drug use: No    Sexual activity: Not on file         Current Outpatient Medications:     ALPRAZolam (XANAX) 0 5 mg tablet, Daily prn, Disp: 10 tablet, Rfl: 0    valACYclovir (VALTREX) 1,000 mg tablet, TK 2 TS PO BID FOR 1 DAY, Disp: , Rfl:     zolpidem (AMBIEN) 10 mg tablet, TK 1 T PO HS IF NEEDED FOR SLEEP, Disp: , Rfl: 3    No Known Allergies    Objective:  Vitals:    09/01/20 1201   BP: 124/79   Pulse: 61   Temp: 98 8 °F (37 1 °C)       Left Shoulder Exam     Tenderness   Left shoulder tenderness location: posterolateral aspect of shoulder  Range of Motion   Active abduction: 160   External rotation: 50   Forward flexion: 170   Internal rotation 0 degrees: L3     Muscle Strength   Abduction: 5/5   Internal rotation: 5/5   External rotation: 5/5   Biceps: 5/5     Other   Erythema: absent  Sensation: normal  Pulse: present (2+ radial)     Comments:    Jerk Maneuver (+) with associated popping  O'briens (+)            Physical Exam  Vitals signs and nursing note reviewed  Constitutional:       Appearance: He is well-developed  HENT:      Head: Normocephalic and atraumatic  Eyes:      Conjunctiva/sclera: Conjunctivae normal       Pupils: Pupils are equal, round, and reactive to light  Neck:      Musculoskeletal: Normal range of motion and neck supple  Cardiovascular:      Rate and Rhythm: Normal rate and regular rhythm  Pulses: Normal pulses  Heart sounds: Normal heart sounds  Pulmonary:      Effort: Pulmonary effort is normal  No respiratory distress  Breath sounds: Normal breath sounds  Musculoskeletal:      Comments: As noted in HPI   Skin:     General: Skin is warm and dry     Neurological:      Mental Status: He is alert and oriented to person, place, and time  Psychiatric:         Behavior: Behavior normal          I have personally reviewed pertinent films in PACS and my interpretation is as follows:  MRI of the left shoulder obtained on 04/26/2018 demonstrate a tear to the superior labral tear with probable loose body and no indication of rotator cuff tear

## 2020-09-02 ENCOUNTER — APPOINTMENT (OUTPATIENT)
Dept: LAB | Facility: HOSPITAL | Age: 40
End: 2020-09-02
Attending: ORTHOPAEDIC SURGERY
Payer: COMMERCIAL

## 2020-09-02 ENCOUNTER — TRANSCRIBE ORDERS (OUTPATIENT)
Dept: ADMINISTRATIVE | Facility: HOSPITAL | Age: 40
End: 2020-09-02

## 2020-09-02 ENCOUNTER — DOCUMENTATION (OUTPATIENT)
Dept: URGENT CARE | Facility: CLINIC | Age: 40
End: 2020-09-02

## 2020-09-02 DIAGNOSIS — M24.012 LOOSE BODY OF LEFT SHOULDER: ICD-10-CM

## 2020-09-02 DIAGNOSIS — Z11.59 SPECIAL SCREENING EXAMINATION FOR UNSPECIFIED VIRAL DISEASE: ICD-10-CM

## 2020-09-02 DIAGNOSIS — S43.432A SUPERIOR GLENOID LABRUM LESION OF LEFT SHOULDER, INITIAL ENCOUNTER: ICD-10-CM

## 2020-09-02 LAB
ANION GAP SERPL CALCULATED.3IONS-SCNC: 6 MMOL/L (ref 4–13)
APTT PPP: 27 SECONDS (ref 23–37)
ATRIAL RATE: 53 BPM
BASOPHILS # BLD AUTO: 0.06 THOUSANDS/ΜL (ref 0–0.1)
BASOPHILS NFR BLD AUTO: 1 % (ref 0–1)
BUN SERPL-MCNC: 15 MG/DL (ref 5–25)
CALCIUM SERPL-MCNC: 8.9 MG/DL (ref 8.3–10.1)
CHLORIDE SERPL-SCNC: 102 MMOL/L (ref 100–108)
CO2 SERPL-SCNC: 30 MMOL/L (ref 21–32)
CREAT SERPL-MCNC: 1.21 MG/DL (ref 0.6–1.3)
EOSINOPHIL # BLD AUTO: 0.28 THOUSAND/ΜL (ref 0–0.61)
EOSINOPHIL NFR BLD AUTO: 5 % (ref 0–6)
ERYTHROCYTE [DISTWIDTH] IN BLOOD BY AUTOMATED COUNT: 12.1 % (ref 11.6–15.1)
GFR SERPL CREATININE-BSD FRML MDRD: 73 ML/MIN/1.73SQ M
GLUCOSE P FAST SERPL-MCNC: 109 MG/DL (ref 65–99)
HCT VFR BLD AUTO: 48.1 % (ref 36.5–49.3)
HGB BLD-MCNC: 15.7 G/DL (ref 12–17)
IMM GRANULOCYTES # BLD AUTO: 0.03 THOUSAND/UL (ref 0–0.2)
IMM GRANULOCYTES NFR BLD AUTO: 1 % (ref 0–2)
LYMPHOCYTES # BLD AUTO: 1.67 THOUSANDS/ΜL (ref 0.6–4.47)
LYMPHOCYTES NFR BLD AUTO: 30 % (ref 14–44)
MCH RBC QN AUTO: 29.7 PG (ref 26.8–34.3)
MCHC RBC AUTO-ENTMCNC: 32.6 G/DL (ref 31.4–37.4)
MCV RBC AUTO: 91 FL (ref 82–98)
MONOCYTES # BLD AUTO: 0.45 THOUSAND/ΜL (ref 0.17–1.22)
MONOCYTES NFR BLD AUTO: 8 % (ref 4–12)
NEUTROPHILS # BLD AUTO: 3.13 THOUSANDS/ΜL (ref 1.85–7.62)
NEUTS SEG NFR BLD AUTO: 55 % (ref 43–75)
NRBC BLD AUTO-RTO: 0 /100 WBCS
P AXIS: 37 DEGREES
PLATELET # BLD AUTO: 319 THOUSANDS/UL (ref 149–390)
PMV BLD AUTO: 9.6 FL (ref 8.9–12.7)
POTASSIUM SERPL-SCNC: 4.3 MMOL/L (ref 3.5–5.3)
PR INTERVAL: 160 MS
QRS AXIS: -22 DEGREES
QRSD INTERVAL: 90 MS
QT INTERVAL: 428 MS
QTC INTERVAL: 401 MS
RBC # BLD AUTO: 5.29 MILLION/UL (ref 3.88–5.62)
SODIUM SERPL-SCNC: 138 MMOL/L (ref 136–145)
T WAVE AXIS: 16 DEGREES
VENTRICULAR RATE: 53 BPM
WBC # BLD AUTO: 5.62 THOUSAND/UL (ref 4.31–10.16)

## 2020-09-02 PROCEDURE — 93010 ELECTROCARDIOGRAM REPORT: CPT | Performed by: INTERNAL MEDICINE

## 2020-09-02 PROCEDURE — 85025 COMPLETE CBC W/AUTO DIFF WBC: CPT

## 2020-09-02 PROCEDURE — 93005 ELECTROCARDIOGRAM TRACING: CPT

## 2020-09-02 PROCEDURE — 36415 COLL VENOUS BLD VENIPUNCTURE: CPT

## 2020-09-02 PROCEDURE — U0003 INFECTIOUS AGENT DETECTION BY NUCLEIC ACID (DNA OR RNA); SEVERE ACUTE RESPIRATORY SYNDROME CORONAVIRUS 2 (SARS-COV-2) (CORONAVIRUS DISEASE [COVID-19]), AMPLIFIED PROBE TECHNIQUE, MAKING USE OF HIGH THROUGHPUT TECHNOLOGIES AS DESCRIBED BY CMS-2020-01-R: HCPCS | Performed by: ORTHOPAEDIC SURGERY

## 2020-09-02 PROCEDURE — 80048 BASIC METABOLIC PNL TOTAL CA: CPT

## 2020-09-02 PROCEDURE — 85730 THROMBOPLASTIN TIME PARTIAL: CPT

## 2020-09-03 LAB — SARS-COV-2 RNA SPEC QL NAA+PROBE: NOT DETECTED

## 2020-09-04 NOTE — PRE-PROCEDURE INSTRUCTIONS
My Surgical Experience    The following information was developed to assist you to prepare for your operation  What do I need to do before coming to the hospital?   Arrange for a responsible person to drive you to and from the hospital    Arrange care for your children at home  Children are not allowed in the recovery areas of the hospital   Plan to wear clothing that is easy to put on and take off  If you are having shoulder surgery, wear a shirt that buttons or zippers in the front  Bathing  o Shower the evening before and the morning of your surgery with an antibacterial soap  Please refer to the Pre Op Showering Instructions for Surgery Patients Sheet   o Remove nail polish and all body piercing jewelry  o Do not shave any body part for at least 24 hours before surgery-this includes face, arms, legs and upper body  Food  o Nothing to eat or drink after midnight the night before your surgery  This includes candy and chewing gum  o Exception: If your surgery is after 12:00pm (noon), you may have clear liquids such as 7-Up®, ginger ale, apple or cranberry juice, Jell-O®, water, or clear broth until 8:00 am  o Do not drink milk or juice with pulp on the morning before surgery  o Do not drink alcohol 24 hours before surgery  Medicine  o Follow instructions you received from your surgeon about which medicines you may take on the day of surgery  o If instructed to take medicine on the morning of surgery, take pills with just a small sip of water  Call your prescribing doctor for specific infroamtion on what to do if you take insulin    What should I bring to the hospital?    Bring:  [place] Conception or a walker, if you have them, for foot or knee surgery   A list of the daily medicines, vitamins, minerals, herbals and nutritional supplements you take   Include the dosages of medicines and the time you take them each day   Glasses, dentures or hearing aids   Minimal clothing; you will be wearing hospital sleepwear   Photo ID; required to verify your identity   If you have a Living Will or Power of , bring a copy of the documents   If you have an ostomy, bring an extra pouch and any supplies you use    Do not bring   Medicines or inhalers   Money, valuables or jewelry    What other information should I know about the day of surgery?  Notify your surgeons if you develop a cold, sore throat, cough, fever, rash or any other illness   Report to the Ambulatory Surgical/Same Day Surgery Unit   You will be instructed to stop at Registration only if you have not been pre-registered   Inform your  fi they do not stay that they will be asked by the staff to leave a phone number where they can be reached   Be available to be reached before surgery  In the event the operating room schedule changes, you may be asked to come in earlier or later than expected    *It is important to tell your doctor and others involved in your health care if you are taking or have been taking any non-prescription drugs, vitamins, minerals, herbals or other nutritional supplements  Any of these may interact with some food or medicines and cause a reaction      Pre-Surgery Instructions:   Medication Instructions    ALPRAZolam (XANAX) 0 5 mg tablet Instructed patient per Anesthesia Guidelines   valACYclovir (VALTREX) 1,000 mg tablet Instructed patient per Anesthesia Guidelines   zolpidem (AMBIEN) 10 mg tablet Instructed patient per Anesthesia Guidelines  May take xanax - prn a m  of surgery

## 2020-09-07 ENCOUNTER — ANESTHESIA EVENT (OUTPATIENT)
Dept: PERIOP | Facility: HOSPITAL | Age: 40
End: 2020-09-07
Payer: COMMERCIAL

## 2020-09-07 NOTE — ANESTHESIA PREPROCEDURE EVALUATION
Procedure:  shoulder arthroscopy superior labral repair possible biceps tenodesis and possible loose body removal (Left Shoulder)    Relevant Problems   HEMATOLOGY  h/o hodgkin's lymphoma 2015      NEURO/PSYCH  h/o chemo and XRT   (+) Anxiety   (+) History of Hodgkin's lymphoma   (+) History of fatty infiltration of liver        Physical Exam    Airway    Mallampati score: III  TM Distance: >3 FB  Neck ROM: full     Dental   No notable dental hx     Cardiovascular  Rhythm: regular, Rate: normal,     Pulmonary  Pulmonary exam normal Breath sounds clear to auscultation, Decreased breath sounds,     Other Findings  Decrease auscultation 2/2 habitus      Anesthesia Plan  ASA Score- 2     Anesthesia Type- general and regional with ASA Monitors  Additional Monitors:   Airway Plan: LMA  Comment: Left interscalene block  Plan Factors-    Chart reviewed  Patient is not a current smoker  Induction- intravenous  Postoperative Plan- Plan for postoperative opioid use  Informed Consent- Anesthetic plan and risks discussed with patient  I personally reviewed this patient with the CRNA  Discussed and agreed on the Anesthesia Plan with the CRNA  José White

## 2020-09-08 ENCOUNTER — ANESTHESIA (OUTPATIENT)
Dept: PERIOP | Facility: HOSPITAL | Age: 40
End: 2020-09-08
Payer: COMMERCIAL

## 2020-09-08 ENCOUNTER — TELEPHONE (OUTPATIENT)
Dept: OBGYN CLINIC | Facility: HOSPITAL | Age: 40
End: 2020-09-08

## 2020-09-08 ENCOUNTER — HOSPITAL ENCOUNTER (OUTPATIENT)
Facility: HOSPITAL | Age: 40
Setting detail: OUTPATIENT SURGERY
Discharge: HOME | End: 2020-09-08
Attending: ORTHOPAEDIC SURGERY | Admitting: ORTHOPAEDIC SURGERY
Payer: COMMERCIAL

## 2020-09-08 VITALS
HEIGHT: 71 IN | HEART RATE: 62 BPM | TEMPERATURE: 97 F | RESPIRATION RATE: 16 BRPM | DIASTOLIC BLOOD PRESSURE: 84 MMHG | SYSTOLIC BLOOD PRESSURE: 136 MMHG | BODY MASS INDEX: 32.93 KG/M2 | WEIGHT: 235.2 LBS | OXYGEN SATURATION: 95 %

## 2020-09-08 VITALS — HEART RATE: 62 BPM

## 2020-09-08 DIAGNOSIS — S43.432D LABRAL TEAR OF SHOULDER, LEFT, SUBSEQUENT ENCOUNTER: Primary | ICD-10-CM

## 2020-09-08 PROCEDURE — 29807 SHO ARTHRS SRG RPR SLAP LES: CPT | Performed by: ORTHOPAEDIC SURGERY

## 2020-09-08 PROCEDURE — C1713 ANCHOR/SCREW BN/BN,TIS/BN: HCPCS | Performed by: ORTHOPAEDIC SURGERY

## 2020-09-08 PROCEDURE — C9290 INJ, BUPIVACAINE LIPOSOME: HCPCS | Performed by: ORTHOPAEDIC SURGERY

## 2020-09-08 PROCEDURE — 29807 SHO ARTHRS SRG RPR SLAP LES: CPT | Performed by: PHYSICIAN ASSISTANT

## 2020-09-08 DEVICE — ANCHOR SUT BIOCOMPOSITE 2.9 X 12.5MM KNOTLESS SHRT PUSHLOCK: Type: IMPLANTABLE DEVICE | Site: SHOULDER | Status: FUNCTIONAL

## 2020-09-08 RX ORDER — LIDOCAINE HYDROCHLORIDE 10 MG/ML
INJECTION, SOLUTION EPIDURAL; INFILTRATION; INTRACAUDAL; PERINEURAL AS NEEDED
Status: DISCONTINUED | OUTPATIENT
Start: 2020-09-08 | End: 2020-09-08

## 2020-09-08 RX ORDER — BUPIVACAINE HYDROCHLORIDE 5 MG/ML
INJECTION, SOLUTION PERINEURAL
Status: COMPLETED | OUTPATIENT
Start: 2020-09-08 | End: 2020-09-08

## 2020-09-08 RX ORDER — ONDANSETRON 2 MG/ML
INJECTION INTRAMUSCULAR; INTRAVENOUS AS NEEDED
Status: DISCONTINUED | OUTPATIENT
Start: 2020-09-08 | End: 2020-09-08

## 2020-09-08 RX ORDER — ONDANSETRON 2 MG/ML
4 INJECTION INTRAMUSCULAR; INTRAVENOUS ONCE AS NEEDED
Status: DISCONTINUED | OUTPATIENT
Start: 2020-09-08 | End: 2020-09-08 | Stop reason: HOSPADM

## 2020-09-08 RX ORDER — PROPOFOL 10 MG/ML
INJECTION, EMULSION INTRAVENOUS AS NEEDED
Status: DISCONTINUED | OUTPATIENT
Start: 2020-09-08 | End: 2020-09-08

## 2020-09-08 RX ORDER — MIDAZOLAM HYDROCHLORIDE 2 MG/2ML
INJECTION, SOLUTION INTRAMUSCULAR; INTRAVENOUS AS NEEDED
Status: DISCONTINUED | OUTPATIENT
Start: 2020-09-08 | End: 2020-09-08

## 2020-09-08 RX ORDER — CEFAZOLIN SODIUM 2 G/50ML
SOLUTION INTRAVENOUS AS NEEDED
Status: DISCONTINUED | OUTPATIENT
Start: 2020-09-08 | End: 2020-09-08

## 2020-09-08 RX ORDER — DEXAMETHASONE SODIUM PHOSPHATE 10 MG/ML
INJECTION, SOLUTION INTRAMUSCULAR; INTRAVENOUS AS NEEDED
Status: DISCONTINUED | OUTPATIENT
Start: 2020-09-08 | End: 2020-09-08

## 2020-09-08 RX ORDER — SODIUM CHLORIDE, SODIUM LACTATE, POTASSIUM CHLORIDE, CALCIUM CHLORIDE 600; 310; 30; 20 MG/100ML; MG/100ML; MG/100ML; MG/100ML
75 INJECTION, SOLUTION INTRAVENOUS CONTINUOUS
Status: DISCONTINUED | OUTPATIENT
Start: 2020-09-08 | End: 2020-09-08 | Stop reason: HOSPADM

## 2020-09-08 RX ORDER — CEFAZOLIN SODIUM 2 G/50ML
2000 SOLUTION INTRAVENOUS ONCE
Status: DISCONTINUED | OUTPATIENT
Start: 2020-09-08 | End: 2020-09-08 | Stop reason: HOSPADM

## 2020-09-08 RX ORDER — OXYCODONE HYDROCHLORIDE AND ACETAMINOPHEN 5; 325 MG/1; MG/1
1 TABLET ORAL EVERY 4 HOURS PRN
Qty: 15 TABLET | Refills: 0 | Status: SHIPPED | OUTPATIENT
Start: 2020-09-08 | End: 2020-11-24 | Stop reason: ALTCHOICE

## 2020-09-08 RX ORDER — FENTANYL CITRATE/PF 50 MCG/ML
50 SYRINGE (ML) INJECTION
Status: DISCONTINUED | OUTPATIENT
Start: 2020-09-08 | End: 2020-09-08 | Stop reason: HOSPADM

## 2020-09-08 RX ORDER — FENTANYL CITRATE 50 UG/ML
INJECTION, SOLUTION INTRAMUSCULAR; INTRAVENOUS AS NEEDED
Status: DISCONTINUED | OUTPATIENT
Start: 2020-09-08 | End: 2020-09-08

## 2020-09-08 RX ADMIN — FENTANYL CITRATE 50 MCG: 50 INJECTION, SOLUTION INTRAMUSCULAR; INTRAVENOUS at 14:32

## 2020-09-08 RX ADMIN — FENTANYL CITRATE 50 MCG: 50 INJECTION, SOLUTION INTRAMUSCULAR; INTRAVENOUS at 15:06

## 2020-09-08 RX ADMIN — CEFAZOLIN SODIUM 2000 MG: 2 SOLUTION INTRAVENOUS at 15:03

## 2020-09-08 RX ADMIN — SODIUM CHLORIDE, SODIUM LACTATE, POTASSIUM CHLORIDE, AND CALCIUM CHLORIDE 75 ML/HR: .6; .31; .03; .02 INJECTION, SOLUTION INTRAVENOUS at 12:49

## 2020-09-08 RX ADMIN — MIDAZOLAM HYDROCHLORIDE 2 MG: 1 INJECTION, SOLUTION INTRAMUSCULAR; INTRAVENOUS at 14:32

## 2020-09-08 RX ADMIN — DEXAMETHASONE SODIUM PHOSPHATE 4 MG: 10 INJECTION, SOLUTION INTRAMUSCULAR; INTRAVENOUS at 15:06

## 2020-09-08 RX ADMIN — BUPIVACAINE HYDROCHLORIDE 5 ML: 5 INJECTION, SOLUTION PERINEURAL at 14:38

## 2020-09-08 RX ADMIN — LIDOCAINE HYDROCHLORIDE 50 MG: 10 INJECTION, SOLUTION EPIDURAL; INFILTRATION; INTRACAUDAL; PERINEURAL at 15:06

## 2020-09-08 RX ADMIN — ONDANSETRON 4 MG: 2 INJECTION INTRAMUSCULAR; INTRAVENOUS at 15:06

## 2020-09-08 RX ADMIN — PROPOFOL 200 MG: 10 INJECTION, EMULSION INTRAVENOUS at 15:06

## 2020-09-08 RX ADMIN — SODIUM CHLORIDE, SODIUM LACTATE, POTASSIUM CHLORIDE, AND CALCIUM CHLORIDE: .6; .31; .03; .02 INJECTION, SOLUTION INTRAVENOUS at 15:36

## 2020-09-08 NOTE — OP NOTE
OPERATIVE REPORT  PATIENT NAME: Akbar Sharif    :  1980  MRN: [id number]  Pt Location: [facility] OR ROOM 03    SURGERY DATE: 2020    Surgeon(s) and Role:     * Vladimir Scott MD - Primary     * Harriet Dukes PA-C - Assisting necessary for the procedure for assistance with minimally invasive arthroscopic techniques for superior labral repair for utilization the camera as well as assistance in utilization the drill and in suture management  Alfredo ANTHONY  And OTC 2nd assistant    Preop Diagnosis:  Superior glenoid labrum lesion of left shoulder, initial encounter [S43 432A]  Loose body of left shoulder [M24 012]    Post-Op Diagnosis Codes:     * Superior glenoid labrum lesion of left shoulder, initial encounter [M94 125P]    Procedure:  Left shoulder arthroscopy with superior labral repair utilizing Arthrex BioComposite PushLock suture anchor 2 9 x 12 5 mm and 1 labral tape    Specimen(s):  * No specimens in log *    Estimated Blood Loss:   Minimal    Drains:  * No LDAs found *    Anesthesia Type:   Regional with general    Operative Indications:  Superior glenoid labrum lesion of left shoulder, initial encounter Ree Estrada is a 40-year-old male who has been suffering chronically with left shoulder pain for more than 2 years  He was found on MRI to have findings suspicious for superior labral tear versus loose body  He had failed conservative measures and wished to undergo a left shoulder arthroscopy with superior labral repair versus biceps tenodesis versus loose body removal   He understood the risks and benefits of that procedure wished to go ahead  The risks are inclusive of but not limited to infection, stiffness, nerve injury causing numbness pain and weakness, recurrence of tear, failure to regain full strength and ability, worsening of symptoms, and need for further surgery      Operative Findings:  Left shoulder exam under anesthesia demonstrated range of motion to 160° of forward flexion 150° of abduction external rotation to 70° and internal rotation is 60°  Intra-articular findings demonstrated mild articular cartilage wear along the glenohumeral joint being no more than grade 2 but intact supraspinatus and subscapularis and long head of biceps tendons  No loose bodies in the axillary pouch nor any loose bodies elsewhere found in the shoulder  The posterior labrum was intact as was the anterior labrum  Superior labrum did have significant tearing along the entirety from anterior to posterior and also demonstrated instability and thus required repair  Positive peel back maneuver  After repair, we had very stable appearance of the superior labrum with a well-seated suture anchor in the home run position  Complications:   None    Procedure and Technique:  Severiano Finner was taken to the operating room and placed supine on the OR table  He was given preoperative IV antibiotics was preoperative regional anesthesia by the attending anesthesiologist   General anesthesia was induced and he was brought comfortably and safely into the beach chair position with all parts well padded and the head neutral in the head dominguez  The left shoulder was taken through exam under anesthesia as described above  The left upper extremity was then prepped and draped in usual sterile fashion  Surgical time-out was taken  We created the posterior portal with 11 blade  Diagnostic arthroscopy was begun an anterior portal was made in the rotator interval with 11 blade  We demonstrated very mild articular cartilage wear throughout the glenohumeral joint but no loose bodies in the axillary pouch nor any other loose bodies elsewhere  The subscapularis and supraspinatus and long head of biceps tendon were all free of any tearing  The anterior labrum and posterior labrum were both intact  Superior labrum had obvious tearing from anterior to posterior being a type 2 slap tear    Thus, this required repair and did have a positive peel back maneuver  We then created a percutaneous trans cuff portal using the Arthrex technique and equipment  We did utilize the bur to create a bleeding bed of bone the superior aspect of the glenoid and then passed a labral tape around the superior labrum in the home run position at the base of the biceps tendon  We were then able to drill for and placed a PushLock anchor which was a short PushLock anchor from Arthrex that was BioComposite  This gave us excellent stability of the superior labrum as we tested the repair and found to very stable  We then removed the arthroscopic equipment and closed the portals 4-0 nylon suture  Dry, sterile dressings were applied with a sling  He tolerated procedure well and transferred to recovery room stable condition  He will follow up in 1 week for suture removal   He will be on the slap repair rehabilitation protocol     I was present for the entire procedure and A qualified resident physician was not available    Patient Disposition:  PACU     SIGNATURE: Jessica Frias MD  DATE: September 8, 2020  TIME: 3:45 PM

## 2020-09-08 NOTE — ANESTHESIA PROCEDURE NOTES
Peripheral Block    Patient location during procedure: holding area  Start time: 9/8/2020 2:38 PM  Reason for block: procedure for pain, at surgeon's request and post-op pain management  Staffing  Anesthesiologist: Kaci Leslie MD  Resident/CRNA: Alen Severs, CRNA  Preanesthetic Checklist  Completed: patient identified, site marked, surgical consent, pre-op evaluation, timeout performed, IV checked, risks and benefits discussed and monitors and equipment checked  Peripheral Block  Patient position: supine  Prep: ChloraPrep  Patient monitoring: heart rate and continuous pulse ox  Block type: interscalene  Laterality: left  Injection technique: single-shot  Procedures: ultrasound guided, Ultrasound guidance required for the procedure to increase accuracy and safety of medication placement and decrease risk of complications  Ultrasound permanent image savedbupivacaine (MARCAINE) 0 5 % perineural infiltration, 5 mL (with 20 ml fo exparel)  Needle  Needle type: Stimuplex   Needle gauge: 22 G  Needle length: 10 cm  Needle localization: ultrasound guidance  Assessment  Injection assessment: negative aspiration for heme, negative aspiration for CSF, local visualized surrounding nerve on ultrasound and no paresthesia on injection  Paresthesia pain: none  Heart rate change: no  Slow fractionated injection: yes  Post-procedure:  site cleaned  patient tolerated the procedure well with no immediate complications  Additional Notes  Tere Rodriguez present for time out and procedure

## 2020-09-08 NOTE — PERIOPERATIVE NURSING NOTE
Unable to verify because site not marked , anesthesia consent not completed and H&P needs to be updated

## 2020-09-08 NOTE — PERIOPERATIVE NURSING NOTE
Patient still pain free  Discharge directions reviewed with patient and wife   Both express understanding  Dressed with assistance   OOB to bathroom voided

## 2020-09-08 NOTE — TELEPHONE ENCOUNTER
Patient sees Dr Lissett Weeks  Patients wife is calling to see when she picked up ICE machine   Called [place] and Marie Maza advised she can pick it up anytime before 5pm

## 2020-09-08 NOTE — DISCHARGE INSTRUCTIONS
Sling:   Wear your sling at all times after your surgery (this includes sleeping), except for when you are doing pendulum exercises, showering, or physical therapy  Additionally, you should not carry anything heavier than a pencil in your hand  Dressing:   Remove all cotton and yellow gauze 48 hours after your surgery  You do not need to put a new dressing on your wound; place Band-Aids on your wound  Showering: You may shower 48 hours after surgery  Please use CAUTION!! Be careful not to slip and fall  The effects of anesthesia and/or medication may make you drowsy or light-headed  Do not soak in a bathtub, hot tub, or pool until the doctor tells you it is O K , to do so  Once you are done showering pat the wound dry and apply a Band-Aid  While in the shower you must keep the arm across the front of the body as if it were still in the sling  Sleeping:   You will most likely have difficulty sleeping in the first few weeks after surgery  Most people find it more comfortable to sleep in a reclining position  You can either sleep in a recliner chair or create this position with pillows  Ice:   You can ice the shoulder to reduce swelling and discomfort  Do not ice the shoulder more than 20 minutes at a time  Let the shoulder warm up before reapplication  Avoid getting you wound wet  If you have a Cryocuff you may keep this on continuously  Follow-up visit:   You need to see the doctor about one week following surgery for your first post-op visit  At that time your sutures (stitches) will be removed  You will be given a prescription to begin physical therapy if you were not already given one  Common concerns:   Bruising and/or swelling of the shoulder, arm, or hand are common after surgery  To relieve this discomfort it is best to ice the shoulder  Please call if:   1  Any oozing or redness of the wound, fevers (>101 3°F), or chills       2  Any difficulty breathing or heaviness in the chest      REMEMBER - these are only guidelines for what to expect  If you have any questions or concerns, please do not hesitate to call the office  [phone]

## 2020-09-08 NOTE — ANESTHESIA POSTPROCEDURE EVALUATION
Post-Op Assessment Note    CV Status:  Stable  Pain Score: 0    Pain management: adequate     Mental Status:  Alert and awake   Hydration Status:  Euvolemic and stable   PONV Controlled:  Controlled   Airway Patency:  Patent      Post Op Vitals Reviewed: Yes      Staff: Anesthesiologist, CRNA   Comments: Report given to recovering RN, VSS, Pt states that he is comfortably        No complications documented      /91 (09/08/20 1600)    Temp 97 7 °F (36 5 °C) (09/08/20 1600)    Pulse 66 (09/08/20 1600)   Resp 16 (09/08/20 1600)    SpO2 96 % (09/08/20 1600)

## 2020-09-08 NOTE — INTERVAL H&P NOTE
H&P reviewed  After examining the patient I find no changes in the patients condition since the H&P had been written      Vitals:    09/08/20 1226   BP: 121/70   Pulse: 61   Resp: 18   Temp: 99 8 °F (37 7 °C)   SpO2: 100%

## 2020-09-08 NOTE — PERIOPERATIVE NURSING NOTE
Received from pacu fully awake and alert Pain level 0/10  Sling intact fingers warm and mobile   Good capillary refill  PO fluids given   Ice on left shoulder punture site dressings dry and intact no staining visible

## 2020-09-16 ENCOUNTER — OFFICE VISIT (OUTPATIENT)
Dept: OBGYN CLINIC | Facility: CLINIC | Age: 40
End: 2020-09-16

## 2020-09-16 DIAGNOSIS — S43.432D TEAR OF LEFT GLENOID LABRUM, SUBSEQUENT ENCOUNTER: Primary | ICD-10-CM

## 2020-09-16 PROCEDURE — 99024 POSTOP FOLLOW-UP VISIT: CPT | Performed by: ORTHOPAEDIC SURGERY

## 2020-09-16 NOTE — PROGRESS NOTES
Assessment/Plan:  1  Tear of left glenoid labrum, subsequent encounter       The patient is doing well and will start PT in 1 more week on the superior labral repair protocol  We will remain in his sling at all times except for PT, showering, and dressing  He will follow-up in 4 weeks for repeat evaluation  Subjective:   Justo Hawkins is a 41 y o  male who presents today for follow-up of his left shoulder, now about a week status post shoulder arthroscopy with superior labral repair  He is doing well and notes minimal pain about the shoulder at this point  He is sleeping fairly well  He notes good sensation of the left upper extremity  He has been in his sling at all times except for dressing and showering  Review of Systems      Past Medical History:   Diagnosis Date    Cancer ([facility] [address] ) 2015    hodgkins lymphoma    [facility]     Sleep disorder     trouble falling asleep and staying asleep       Past Surgical History:   Procedure Laterality Date    APPENDECTOMY      [place] ARTHROSCOPY SHOULDER SURGICAL BICEPS TENODESIS Left 2020    Procedure: shoulder arthroscopy superior labral repair;  Surgeon: Kassy Webb MD;  [facility];  Service: Orthopedics    THORACOSCOPY VIDEO ASSISTED SURGERY (VATS)         Family History   Adopted: Yes       Social History     Occupational History    Not on file   Tobacco Use    Smoking status: Former Smoker     Packs/day: 0 50     Years: 15 00     Pack years: 7 50     Types: Cigarettes     Last attempt to quit: 2011     Years since quittin 0    Smokeless tobacco: Never Used   Substance and Sexual Activity    Alcohol use:  Yes     Alcohol/week: 2 0 standard drinks     Types: 2 Cans of beer per week     Frequency: Monthly or less     Drinks per session: 1 or 2     Binge frequency: Never    Drug use: Yes     Types: Marijuana     Comment: rarely uses for pain    Sexual activity: Not on file         Current Outpatient Medications:    ALPRAZolam (XANAX) 0 5 mg tablet, Daily prn, Disp: 10 tablet, Rfl: 0    oxyCODONE-acetaminophen (PERCOCET) 5-325 mg per tablet, Take 1 tablet by mouth every 4 (four) hours as needed for moderate pain for up to 15 dosesMax Daily Amount: 6 tablets, Disp: 15 tablet, Rfl: 0    valACYclovir (VALTREX) 1,000 mg tablet, TK 2 TS PO BID FOR 1 DAY, Disp: , Rfl:     zolpidem (AMBIEN) 10 mg tablet, TK 1 T PO HS IF NEEDED FOR SLEEP, Disp: , Rfl: 3    No Known Allergies    Objective: There were no vitals filed for this visit  Ortho Exam    Physical Exam      Left shoulder: Sutures removed  Incisions CDI  NO erythema or ecchymosis  Moves elbow and wrist and fingers freely  Sensation intact  2+ radial pulse

## 2020-09-23 ENCOUNTER — EVALUATION (OUTPATIENT)
Dept: PHYSICAL THERAPY | Facility: CLINIC | Age: 40
End: 2020-09-23
Payer: COMMERCIAL

## 2020-09-23 DIAGNOSIS — M24.012 LOOSE BODY IN JOINT OF LEFT SHOULDER REGION: ICD-10-CM

## 2020-09-23 DIAGNOSIS — S43.432A TEAR OF LEFT GLENOID LABRUM, INITIAL ENCOUNTER: ICD-10-CM

## 2020-09-23 PROCEDURE — 97161 PT EVAL LOW COMPLEX 20 MIN: CPT | Performed by: PHYSICAL THERAPIST

## 2020-09-23 PROCEDURE — 97110 THERAPEUTIC EXERCISES: CPT | Performed by: PHYSICAL THERAPIST

## 2020-09-23 NOTE — PROGRESS NOTES
PT Evaluation     Today's date: 2020  Patient name: Nomi Feliz  : 1980  MRN: [id number]  Referring provider: Yen Aguero MD  Dx:   Encounter Diagnosis     ICD-10-CM    1  Loose body in joint of left shoulder region  M24 012 Ambulatory referral to Physical Therapy   2  Tear of left glenoid labrum, initial encounter  S43 432A Ambulatory referral to Physical Therapy                  Assessment  Assessment details: Nomi Feliz is a 41 y o  male who presents with pain, decreased strength, decreased ROM, decreased joint mobility and postural  dysfunction Due to these impairments, patient has difficulty performing ADL's, recreational activities, work-related activities, engaging in social activities, lifting/carrying, reaching  Patient's clinical presentation is consistent with that of the referring diagnosis  Patient has been educated in post-op contraindications / precautions, postural education, home exercise program and plan of care  Patient would benefit from skilled physical therapy services to address their aforementioned functional limitations and progress towards prior level of function and independence with home exercise program     Impairments: abnormal or restricted ROM, activity intolerance, impaired physical strength, lacks appropriate home exercise program, pain with function, poor posture  and poor body mechanics  Understanding of Dx/Px/POC: good   Prognosis: good    Goals  STG:  to be achieved within 2-4 weeks  1  Pt will demo 50% improvement in shoulder AROM to improve self care and household ADLs   2  Improve shoulder strength in all deficient planes by 1/2 MMT  3  Pt will have good understanding of HEP  4  Pt will demo good sitting and standing posture  5  Pt will report pain <(3)/10 in shoulder  LTG:  to be achieved within 4-16 weeks  1  Improve shoulder AROM to be able to reach behind back to tuck in shirt    2  Improve strength to be able to put dishes into an overhead cabinet  3  Pt will be able to sleep without disturbance secondary to shoulder pain  4  Pt will demo full shoulder AROM to return to household duties  5  Pt will return to work/household ADLs pain free as per PLOF  6  Pt will demo shoulder strength WNL  Plan  Plan details:  HEP development, stretching, strengthening, A/AA/PROM, joint mobilizations, posture education, STM/MI as needed to reduce muscle tension, muscle reeducation, PLOC discussed and agreed upon with patient  Patient would benefit from: PT eval and skilled physical therapy  Planned modality interventions: cryotherapy  Planned therapy interventions: manual therapy, neuromuscular re-education, self care, therapeutic exercise, therapeutic activities, home exercise program, joint mobilization, postural training, patient education, strengthening and stretching  Frequency: 3x week (2-3x/week)  Duration in weeks: 6  Treatment plan discussed with: patient        Subjective Evaluation    History of Present Illness  Date of surgery: 2020  Mechanism of injury: Pt has a > 20 yr history of left shoulder pain/issues  Pain had progressed to where it took him to see Dr Dennise Schirmer  He had an MRI performed showing labral tear and scheduled surgery for 20  He is now being referred to physical S/P surgery  Pt in currently in a sling  except for self care activities  He experiences pain with any quick movements or trying to move arm away from side  The shoulder is restricting his ability to dress, bath, sleeping, driving    Pain  Current pain ratin  At best pain ratin  At worst pain ratin  Location: Left global shoulder discomfort  Quality: dull ache, sharp and throbbing  Aggravating factors: lifting and overhead activity  Progression: improved    Social Support    Employment status: working ( where he is at times required to do physical  work)  Hand dominance: right    Treatments  Current treatment: physical therapy  Patient Goals  Patient goals for therapy: decreased pain, increased motion, increased strength, independence with ADLs/IADLs, return to sport/leisure activities and return to work  Patient goal: golf        Objective     Static Posture     Shoulders  Rounded  Postural Observations  Seated posture: poor  Standing posture: poor        Palpation   Left   Muscle spasm in the anterior deltoid, biceps, pectoralis minor, teres major and teres minor  Tenderness of the anterior deltoid, biceps, pectoralis minor, supraspinatus, teres major and teres minor  Right   Tenderness of the biceps and rhomboids  Additional Palpation Details  Global Allodynia surrounding left shoulder  Active Range of Motion     Right Shoulder   Flexion: 170 degrees   Abduction: 180 degrees   External rotation 90°: 90 degrees  Internal rotation 90°: 55 degrees     Passive Range of Motion   Left Shoulder   Flexion: 90 degrees   Abduction: 70 degrees   External rotation 45°: 20 degrees   Internal rotation 45°: 40 degrees     Strength/Myotome Testing     Right Shoulder     Planes of Motion   Flexion: 5   Abduction: 5 (Pain)   External rotation at 0°: 4+ (Pain)   Internal rotation at 0°: 5       Flowsheet Rows      Most Recent Value   PT/OT G-Codes   Current Score  38   Projected Score  77             Precautions: See SLAP repair Protocol, Lymphoma on watch  Initiated HEP as per medbridge access code:  [facility]    Manuals 9/23                                       Neuro Re-Ed                                                                Ther Ex        Sup scap retr 10x5"       Elbow flex/ext AROM 15x       Wrist flex ext AROM 15x       Pendulums CW/CCW 20x each                                       Ther Activity                        Gait Training                        Modalities

## 2020-09-24 ENCOUNTER — OFFICE VISIT (OUTPATIENT)
Dept: PHYSICAL THERAPY | Facility: CLINIC | Age: 40
End: 2020-09-24
Payer: COMMERCIAL

## 2020-09-24 DIAGNOSIS — S43.432A TEAR OF LEFT GLENOID LABRUM, INITIAL ENCOUNTER: ICD-10-CM

## 2020-09-24 DIAGNOSIS — M24.012 LOOSE BODY IN JOINT OF LEFT SHOULDER REGION: Primary | ICD-10-CM

## 2020-09-24 PROCEDURE — 97140 MANUAL THERAPY 1/> REGIONS: CPT | Performed by: PHYSICAL THERAPIST

## 2020-09-24 PROCEDURE — 97110 THERAPEUTIC EXERCISES: CPT | Performed by: PHYSICAL THERAPIST

## 2020-09-24 NOTE — PROGRESS NOTES
Daily Note     Today's date: 2020  Patient name: Mandeep Bejarano  : 1980  MRN: [id number]  Referring provider: Christina Orozco MD  Dx:   Encounter Diagnosis     ICD-10-CM    1  Loose body in joint of left shoulder region  M24 012    2  Tear of left glenoid labrum, initial encounter  S43 432A                 Subjective: "Shoulder is actually feeling a little better, just sore  I have to be careful in how I move it still "      Objective: See treatment diary below      Assessment: Tolerated treatment well  Patient demos significant gain in ROM in all directions compared to 1st session  Decreased global spasming and guarding surrounding shoulder  Importance of use of sling and avoiding overhead or heavy lifting was emphasized to pt  Plan: Progress treament per protocol  Precautions: See SLAP repair Protocol, Lymphoma on watch  Initiated HEP as per [employer] access code:  [email or web address] updated on 20    Manuals       PROM   Flex/abd      Rhythmic stabs sup 90  2x45"                      Neuro Re-Ed                                                                Ther Ex        Sup scap retr 10x5" 2x10 5"      Elbow flex/ext AROM 15x 15x in pron /neut/sup      Wrist flex ext AROM 15x 15x each      Pendulums CW/CCW 20x each 30x each      Sup Cane flex  2x10      Sup cane ER 0 abd  2x10      Behind Back IR str  3x30"      Iso IR/ER at neutral  2x10 5" each      Ther Activity                        Gait Training                        Modalities

## 2020-09-28 ENCOUNTER — OFFICE VISIT (OUTPATIENT)
Dept: PHYSICAL THERAPY | Facility: CLINIC | Age: 40
End: 2020-09-28
Payer: COMMERCIAL

## 2020-09-28 DIAGNOSIS — M24.012 LOOSE BODY IN JOINT OF LEFT SHOULDER REGION: Primary | ICD-10-CM

## 2020-09-28 DIAGNOSIS — S43.432A TEAR OF LEFT GLENOID LABRUM, INITIAL ENCOUNTER: ICD-10-CM

## 2020-09-28 PROCEDURE — 97110 THERAPEUTIC EXERCISES: CPT | Performed by: PHYSICAL THERAPIST

## 2020-09-28 PROCEDURE — 97140 MANUAL THERAPY 1/> REGIONS: CPT | Performed by: PHYSICAL THERAPIST

## 2020-09-28 NOTE — PROGRESS NOTES
Daily Note     Today's date: 2020  Patient name: Galdino Good  : 1980  MRN: [id number]  Referring provider: Smitha Nelson MD  Dx:   Encounter Diagnosis     ICD-10-CM    1  Loose body in joint of left shoulder region  M24 012    2  Tear of left glenoid labrum, initial encounter  I01 894T                   Subjective: "Shoulder was very sore after doing too much with arm on Friday  It was sore most of weekend but is now calming down "      Objective: See treatment diary below      Assessment: Tolerated treatment well  Patient would benefit from continued PT  Pt needs cues to reduce intensity of isometrics as he tends to over exert  Also emphasized importance of not over using shoulder with reaching/lifting  Plan: Progress treament per protocol  Precautions: See SLAP repair Protocol, Lymphoma on watch  Initiated HEP as per [employer] access code:  [email or web address] updated on 20    Manuals      PROM   Flex/abd Flex/abd     Rhythmic stabs sup 90  2x45" 2x45"                     Neuro Re-Ed                                                                Ther Ex        Sup scap retr 10x5" 2x10 5"      Elbow flex/ext AROM 15x 15x in pron /neut/sup 15x each     Wrist flex ext AROM 15x 15x each      Pendulums CW/CCW 20x each 30x each 30x each     Sup Cane flex  2x10 3x10     Sup cane ER 0 abd  2x10 3x10     Behind Back IR str  3x30" 3x30"     Iso IR/ER at neutral  2x10 5" each 2x10 5" each     S/L scap dep/elev +pro/retr   30x each     Ther Activity                        Gait Training                        Modalities

## 2020-09-30 ENCOUNTER — OFFICE VISIT (OUTPATIENT)
Dept: PHYSICAL THERAPY | Facility: CLINIC | Age: 40
End: 2020-09-30
Payer: COMMERCIAL

## 2020-09-30 DIAGNOSIS — M24.012 LOOSE BODY IN JOINT OF LEFT SHOULDER REGION: Primary | ICD-10-CM

## 2020-09-30 DIAGNOSIS — S43.432A TEAR OF LEFT GLENOID LABRUM, INITIAL ENCOUNTER: ICD-10-CM

## 2020-09-30 PROCEDURE — 97140 MANUAL THERAPY 1/> REGIONS: CPT

## 2020-09-30 PROCEDURE — 97110 THERAPEUTIC EXERCISES: CPT

## 2020-09-30 PROCEDURE — 97112 NEUROMUSCULAR REEDUCATION: CPT

## 2020-09-30 NOTE — PROGRESS NOTES
Daily Note     Today's date: 2020  Patient name: Kirti Cadet  : 1980  MRN: [id number]  Referring provider: Nia Gutierrez MD  Dx:   Encounter Diagnosis     ICD-10-CM    1  Loose body in joint of left shoulder region  M24 012    2  Tear of left glenoid labrum, initial encounter  S43 432A                   Subjective: Pt reports 4/10 pain in L shld prior to session  Objective: See treatment diary below      Assessment: Tolerated treatment well  Patient would benefit from continued PT  Pt required cues for 0 deg abd when performing ER AAROM  Plan: Progress treament per protocol  Precautions: See SLAP repair Protocol, Lymphoma on watch  Initiated HEP as per [employer] access code:  [email or web address] updated on 20    Manuals     PROM   Flex/abd Flex/abd Flex/abd    Rhythmic stabs sup 90  2x45" 2x45" 2x45"                    Neuro Re-Ed                                                                Ther Ex        Sup scap retr 10x5" 2x10 5"      Elbow flex/ext AROM 15x 15x in pron /neut/sup 15x each 20x ea    Wrist flex ext AROM 15x 15x each      Pendulums CW/CCW 20x each 30x each 30x each 30x    Sup Cane flex  2x10 3x10 3x10    Sup cane ER 0 abd  2x10 3x10 3x10    Behind Back IR str  3x30" 3x30" 3x30"    Iso IR/ER at neutral  2x10 5" each 2x10 5" each 2x10 5" each    S/L scap dep/elev +pro/retr   30x each 30x ea    Ther Activity                        Gait Training                        Modalities

## 2020-10-02 ENCOUNTER — APPOINTMENT (OUTPATIENT)
Dept: PHYSICAL THERAPY | Facility: CLINIC | Age: 40
End: 2020-10-02
Payer: COMMERCIAL

## 2020-10-05 ENCOUNTER — OFFICE VISIT (OUTPATIENT)
Dept: PHYSICAL THERAPY | Facility: CLINIC | Age: 40
End: 2020-10-05
Payer: COMMERCIAL

## 2020-10-05 DIAGNOSIS — M24.012 LOOSE BODY IN JOINT OF LEFT SHOULDER REGION: Primary | ICD-10-CM

## 2020-10-05 DIAGNOSIS — S43.432A TEAR OF LEFT GLENOID LABRUM, INITIAL ENCOUNTER: ICD-10-CM

## 2020-10-05 PROCEDURE — 97140 MANUAL THERAPY 1/> REGIONS: CPT | Performed by: PHYSICAL THERAPIST

## 2020-10-05 PROCEDURE — 97110 THERAPEUTIC EXERCISES: CPT | Performed by: PHYSICAL THERAPIST

## 2020-10-07 ENCOUNTER — APPOINTMENT (OUTPATIENT)
Dept: PHYSICAL THERAPY | Facility: CLINIC | Age: 40
End: 2020-10-07
Payer: COMMERCIAL

## 2020-10-09 ENCOUNTER — OFFICE VISIT (OUTPATIENT)
Dept: PHYSICAL THERAPY | Facility: CLINIC | Age: 40
End: 2020-10-09
Payer: COMMERCIAL

## 2020-10-09 DIAGNOSIS — M24.012 LOOSE BODY IN JOINT OF LEFT SHOULDER REGION: Primary | ICD-10-CM

## 2020-10-09 DIAGNOSIS — S43.432A TEAR OF LEFT GLENOID LABRUM, INITIAL ENCOUNTER: ICD-10-CM

## 2020-10-09 PROCEDURE — 97112 NEUROMUSCULAR REEDUCATION: CPT | Performed by: PHYSICAL THERAPIST

## 2020-10-09 PROCEDURE — 97110 THERAPEUTIC EXERCISES: CPT | Performed by: PHYSICAL THERAPIST

## 2020-10-12 ENCOUNTER — OFFICE VISIT (OUTPATIENT)
Dept: PHYSICAL THERAPY | Facility: CLINIC | Age: 40
End: 2020-10-12
Payer: COMMERCIAL

## 2020-10-12 DIAGNOSIS — M24.012 LOOSE BODY IN JOINT OF LEFT SHOULDER REGION: Primary | ICD-10-CM

## 2020-10-12 DIAGNOSIS — S43.432A TEAR OF LEFT GLENOID LABRUM, INITIAL ENCOUNTER: ICD-10-CM

## 2020-10-12 PROCEDURE — 97112 NEUROMUSCULAR REEDUCATION: CPT

## 2020-10-12 PROCEDURE — 97110 THERAPEUTIC EXERCISES: CPT

## 2020-10-12 PROCEDURE — 97140 MANUAL THERAPY 1/> REGIONS: CPT

## 2020-10-13 ENCOUNTER — OFFICE VISIT (OUTPATIENT)
Dept: PHYSICAL THERAPY | Facility: CLINIC | Age: 40
End: 2020-10-13
Payer: COMMERCIAL

## 2020-10-13 DIAGNOSIS — S43.432A TEAR OF LEFT GLENOID LABRUM, INITIAL ENCOUNTER: Primary | ICD-10-CM

## 2020-10-13 DIAGNOSIS — M24.012 LOOSE BODY IN JOINT OF LEFT SHOULDER REGION: ICD-10-CM

## 2020-10-13 PROCEDURE — 97112 NEUROMUSCULAR REEDUCATION: CPT | Performed by: PHYSICAL THERAPIST

## 2020-10-13 PROCEDURE — 97110 THERAPEUTIC EXERCISES: CPT | Performed by: PHYSICAL THERAPIST

## 2020-10-20 ENCOUNTER — TELEMEDICINE (OUTPATIENT)
Dept: PHYSICAL THERAPY | Facility: CLINIC | Age: 40
End: 2020-10-20
Payer: COMMERCIAL

## 2020-10-20 DIAGNOSIS — M24.012 LOOSE BODY IN JOINT OF LEFT SHOULDER REGION: Primary | ICD-10-CM

## 2020-10-20 DIAGNOSIS — S43.432A TEAR OF LEFT GLENOID LABRUM, INITIAL ENCOUNTER: ICD-10-CM

## 2020-10-20 PROCEDURE — 97112 NEUROMUSCULAR REEDUCATION: CPT | Performed by: PHYSICAL THERAPIST

## 2020-10-20 PROCEDURE — 97110 THERAPEUTIC EXERCISES: CPT | Performed by: PHYSICAL THERAPIST

## 2020-10-27 ENCOUNTER — TELEMEDICINE (OUTPATIENT)
Dept: PHYSICAL THERAPY | Facility: CLINIC | Age: 40
End: 2020-10-27
Payer: COMMERCIAL

## 2020-10-27 DIAGNOSIS — S43.432A TEAR OF LEFT GLENOID LABRUM, INITIAL ENCOUNTER: Primary | ICD-10-CM

## 2020-10-27 DIAGNOSIS — M24.012 LOOSE BODY IN JOINT OF LEFT SHOULDER REGION: ICD-10-CM

## 2020-10-27 PROCEDURE — 97112 NEUROMUSCULAR REEDUCATION: CPT | Performed by: PHYSICAL THERAPIST

## 2020-10-27 PROCEDURE — 97110 THERAPEUTIC EXERCISES: CPT | Performed by: PHYSICAL THERAPIST

## 2020-11-24 ENCOUNTER — OFFICE VISIT (OUTPATIENT)
Dept: OBGYN CLINIC | Facility: CLINIC | Age: 40
End: 2020-11-24

## 2020-11-24 VITALS
WEIGHT: 225 LBS | SYSTOLIC BLOOD PRESSURE: 121 MMHG | HEART RATE: 57 BPM | BODY MASS INDEX: 31.5 KG/M2 | HEIGHT: 71 IN | DIASTOLIC BLOOD PRESSURE: 85 MMHG

## 2020-11-24 DIAGNOSIS — S43.432D TEAR OF LEFT GLENOID LABRUM, SUBSEQUENT ENCOUNTER: Primary | ICD-10-CM

## 2020-11-24 DIAGNOSIS — Z98.890 STATUS POST ARTHROSCOPY OF LEFT SHOULDER: ICD-10-CM

## 2020-11-24 PROCEDURE — 99024 POSTOP FOLLOW-UP VISIT: CPT | Performed by: ORTHOPAEDIC SURGERY

## 2022-06-30 ENCOUNTER — OFFICE VISIT (OUTPATIENT)
Dept: FAMILY MEDICINE CLINIC | Facility: CLINIC | Age: 42
End: 2022-06-30
Payer: COMMERCIAL

## 2022-06-30 VITALS
OXYGEN SATURATION: 98 % | RESPIRATION RATE: 16 BRPM | TEMPERATURE: 98.5 F | SYSTOLIC BLOOD PRESSURE: 116 MMHG | HEIGHT: 71 IN | BODY MASS INDEX: 33.49 KG/M2 | WEIGHT: 239.2 LBS | DIASTOLIC BLOOD PRESSURE: 86 MMHG | HEART RATE: 77 BPM

## 2022-06-30 DIAGNOSIS — Z13.0 SCREENING, DEFICIENCY ANEMIA, IRON: ICD-10-CM

## 2022-06-30 DIAGNOSIS — Z13.228 SCREENING FOR ENDOCRINE, NUTRITIONAL, METABOLIC AND IMMUNITY DISORDER: ICD-10-CM

## 2022-06-30 DIAGNOSIS — F41.9 ANXIETY: ICD-10-CM

## 2022-06-30 DIAGNOSIS — Z13.1 SCREENING FOR DIABETES MELLITUS: ICD-10-CM

## 2022-06-30 DIAGNOSIS — Z13.0 SCREENING FOR ENDOCRINE, NUTRITIONAL, METABOLIC AND IMMUNITY DISORDER: ICD-10-CM

## 2022-06-30 DIAGNOSIS — Z13.29 SCREENING FOR ENDOCRINE, NUTRITIONAL, METABOLIC AND IMMUNITY DISORDER: ICD-10-CM

## 2022-06-30 DIAGNOSIS — Z13.21 SCREENING FOR ENDOCRINE, NUTRITIONAL, METABOLIC AND IMMUNITY DISORDER: ICD-10-CM

## 2022-06-30 DIAGNOSIS — E55.9 VITAMIN D DEFICIENCY: ICD-10-CM

## 2022-06-30 DIAGNOSIS — Z13.220 LIPID SCREENING: ICD-10-CM

## 2022-06-30 DIAGNOSIS — F34.1 DYSTHYMIA: ICD-10-CM

## 2022-06-30 DIAGNOSIS — R53.83 FATIGUE, UNSPECIFIED TYPE: Primary | ICD-10-CM

## 2022-06-30 DIAGNOSIS — Z13.29 SCREENING FOR THYROID DISORDER: ICD-10-CM

## 2022-06-30 PROCEDURE — 3008F BODY MASS INDEX DOCD: CPT | Performed by: NURSE PRACTITIONER

## 2022-06-30 PROCEDURE — 99203 OFFICE O/P NEW LOW 30 MIN: CPT | Performed by: NURSE PRACTITIONER

## 2022-06-30 NOTE — PROGRESS NOTES
Assessment/Plan:    1  Fatigue, unspecified type  -     Comprehensive metabolic panel; Future  -     TSH, 3rd generation with Free T4 reflex; Future  -     Testosterone, free, total; Future  -     T3, free; Future  -     CBC and differential; Future  -     Vitamin D 25 hydroxy; Future    2  Anxiety  -     TSH, 3rd generation with Free T4 reflex; Future  -     Testosterone, free, total; Future  -     T3, free; Future  -     Vitamin D 25 hydroxy; Future    3  Dysthymia  -     Comprehensive metabolic panel; Future  -     TSH, 3rd generation with Free T4 reflex; Future  -     Testosterone, free, total; Future  -     T3, free; Future  -     CBC and differential; Future  -     Vitamin D 25 hydroxy; Future    4  Screening for endocrine, nutritional, metabolic and immunity disorder  -     Comprehensive metabolic panel; Future  -     TSH, 3rd generation with Free T4 reflex; Future  -     Testosterone, free, total; Future  -     T3, free; Future  -     CBC and differential; Future  -     Lipid panel; Future  -     Vitamin D 25 hydroxy; Future    5  Vitamin D deficiency  -     Vitamin D 25 hydroxy; Future    6  Screening for diabetes mellitus  -     Comprehensive metabolic panel; Future    7  Screening for thyroid disorder  -     TSH, 3rd generation with Free T4 reflex; Future  -     T3, free; Future    8  Screening, deficiency anemia, iron  -     CBC and differential; Future    9  Lipid screening  -     Lipid panel; Future    The case discussed with patient using patient centered shared decision making  The patient was counseled regarding instructions for management,-- risk factor reductions,-- prognosis,-- impressions,-- risks and benefits of treatment options,-- importance of compliance with treatment  I have reviewed the instructions with the patient, answering all questions to his satisfaction      Comprehensive labs ordered  Advised return to office to review results, for an annual PE  He advises that he is planning on establishing with Dr Negra Sherwood  He will wait until she returns    Will call with lab results        There are no Patient Instructions on file for this visit  Return in about 4 weeks (around 7/28/2022) for Annual physical, Recheck  Subjective:      Patient ID: Jung Bell is a 38 y o  male  Chief Complaint   Patient presents with   [place] wants a lab slip for a work up due to CA and anxiety/frustration        Patients presents for acute visit  Requesting lab order  C/o recent onset depression, anxiety, fatigue, irritability  No known stressors, triggers  He has h/o lymphoma  Is followed by his onc    Denies h/o anxiety and depression          The following portions of the patient's history were reviewed and updated as appropriate: allergies, current medications, past family history, past medical history, past social history, past surgical history and problem list     Review of Systems   Constitutional: Positive for fatigue  Negative for fever and unexpected weight change  HENT: Negative for trouble swallowing  Respiratory: Negative  Cardiovascular: Negative  Endocrine: Negative  Neurological: Negative  Psychiatric/Behavioral: Positive for decreased concentration and dysphoric mood  Negative for self-injury, sleep disturbance and suicidal ideas  The patient is nervous/anxious  Current Outpatient Medications   Medication Sig Dispense Refill    ALPRAZolam (XANAX) 0 5 mg tablet Daily prn 10 tablet 0    zolpidem (AMBIEN) 10 mg tablet TK 1 T PO HS IF NEEDED FOR SLEEP  3    valACYclovir (VALTREX) 1,000 mg tablet TK 2 TS PO BID FOR 1 DAY (Patient not taking: Reported on 6/30/2022)       No current facility-administered medications for this visit         Objective:    /86 (BP Location: Left arm, Patient Position: Sitting, Cuff Size: Large)   Pulse 77   Temp 98 5 °F (36 9 °C)   Resp 16   Ht 5' 11" (1 803 m)   Wt 109 kg (239 lb 3 2 oz)   SpO2 98%   BMI 33 36 kg/m²        Physical Exam  Vitals and nursing note reviewed  Constitutional:       General: He is not in acute distress  Appearance: Normal appearance  He is not ill-appearing  Skin:     Coloration: Skin is not pale  Neurological:      General: No focal deficit present  Mental Status: He is alert     Psychiatric:         Mood and Affect: Mood normal                 Kailey Meyer, [address]

## 2022-09-26 ENCOUNTER — TELEPHONE (OUTPATIENT)
Dept: FAMILY MEDICINE CLINIC | Facility: CLINIC | Age: 42
End: 2022-09-26

## 2022-09-26 DIAGNOSIS — Z11.52 ENCOUNTER FOR SCREENING FOR COVID-19: Primary | ICD-10-CM

## 2022-09-26 NOTE — TELEPHONE ENCOUNTER
Previous patient of Joycelyn Kim needs an order placed in his chart for Covid swab for travel  He has not established with any provider as of yet

## 2023-04-24 ENCOUNTER — TELEPHONE (OUTPATIENT)
Dept: FAMILY MEDICINE CLINIC | Facility: CLINIC | Age: 43
End: 2023-04-24

## 2023-04-24 ENCOUNTER — APPOINTMENT (EMERGENCY)
Dept: RADIOLOGY | Facility: HOSPITAL | Age: 43
End: 2023-04-24

## 2023-04-24 ENCOUNTER — HOSPITAL ENCOUNTER (EMERGENCY)
Facility: HOSPITAL | Age: 43
Discharge: HOME | End: 2023-04-24
Attending: EMERGENCY MEDICINE

## 2023-04-24 VITALS
HEART RATE: 86 BPM | SYSTOLIC BLOOD PRESSURE: 123 MMHG | BODY MASS INDEX: 34.22 KG/M2 | DIASTOLIC BLOOD PRESSURE: 81 MMHG | RESPIRATION RATE: 22 BRPM | TEMPERATURE: 99.2 F | OXYGEN SATURATION: 97 % | WEIGHT: 245.37 LBS

## 2023-04-24 DIAGNOSIS — K21.9 GERD (GASTROESOPHAGEAL REFLUX DISEASE): ICD-10-CM

## 2023-04-24 DIAGNOSIS — R07.89 ATYPICAL CHEST PAIN: Primary | ICD-10-CM

## 2023-04-24 LAB
ALBUMIN SERPL BCP-MCNC: 4.5 G/DL (ref 3.5–5)
ALP SERPL-CCNC: 71 U/L (ref 34–104)
ALT SERPL W P-5'-P-CCNC: 28 U/L (ref 7–52)
ANION GAP SERPL CALCULATED.3IONS-SCNC: 6 MMOL/L (ref 4–13)
APTT PPP: 28 SECONDS (ref 23–37)
AST SERPL W P-5'-P-CCNC: 19 U/L (ref 13–39)
BASOPHILS # BLD AUTO: 0.02 THOUSANDS/ΜL (ref 0–0.1)
BASOPHILS NFR BLD AUTO: 0 % (ref 0–1)
BILIRUB SERPL-MCNC: 0.83 MG/DL (ref 0.2–1)
BNP SERPL-MCNC: 5 PG/ML (ref 0–100)
BUN SERPL-MCNC: 12 MG/DL (ref 5–25)
CALCIUM SERPL-MCNC: 9.7 MG/DL (ref 8.4–10.2)
CARDIAC TROPONIN I PNL SERPL HS: 2 NG/L
CHLORIDE SERPL-SCNC: 100 MMOL/L (ref 96–108)
CO2 SERPL-SCNC: 30 MMOL/L (ref 21–32)
CREAT SERPL-MCNC: 0.94 MG/DL (ref 0.6–1.3)
EOSINOPHIL # BLD AUTO: 0.13 THOUSAND/ΜL (ref 0–0.61)
EOSINOPHIL NFR BLD AUTO: 2 % (ref 0–6)
ERYTHROCYTE [DISTWIDTH] IN BLOOD BY AUTOMATED COUNT: 12.3 % (ref 11.6–15.1)
GFR SERPL CREATININE-BSD FRML MDRD: 97 ML/MIN/1.73SQ M
GLUCOSE SERPL-MCNC: 105 MG/DL (ref 65–140)
HCT VFR BLD AUTO: 44.5 % (ref 36.5–49.3)
HGB BLD-MCNC: 15.4 G/DL (ref 12–17)
IMM GRANULOCYTES # BLD AUTO: 0.03 THOUSAND/UL (ref 0–0.2)
IMM GRANULOCYTES NFR BLD AUTO: 0 % (ref 0–2)
INR PPP: 0.97 (ref 0.84–1.19)
LYMPHOCYTES # BLD AUTO: 0.82 THOUSANDS/ΜL (ref 0.6–4.47)
LYMPHOCYTES NFR BLD AUTO: 10 % (ref 14–44)
MCH RBC QN AUTO: 30 PG (ref 26.8–34.3)
MCHC RBC AUTO-ENTMCNC: 34.6 G/DL (ref 31.4–37.4)
MCV RBC AUTO: 87 FL (ref 82–98)
MONOCYTES # BLD AUTO: 0.47 THOUSAND/ΜL (ref 0.17–1.22)
MONOCYTES NFR BLD AUTO: 6 % (ref 4–12)
NEUTROPHILS # BLD AUTO: 6.43 THOUSANDS/ΜL (ref 1.85–7.62)
NEUTS SEG NFR BLD AUTO: 82 % (ref 43–75)
NRBC BLD AUTO-RTO: 0 /100 WBCS
PLATELET # BLD AUTO: 277 THOUSANDS/UL (ref 149–390)
PMV BLD AUTO: 9.2 FL (ref 8.9–12.7)
POTASSIUM SERPL-SCNC: 3.7 MMOL/L (ref 3.5–5.3)
PROT SERPL-MCNC: 7.5 G/DL (ref 6.4–8.4)
PROTHROMBIN TIME: 12.9 SECONDS (ref 11.6–14.5)
RBC # BLD AUTO: 5.14 MILLION/UL (ref 3.88–5.62)
SODIUM SERPL-SCNC: 136 MMOL/L (ref 135–147)
WBC # BLD AUTO: 7.9 THOUSAND/UL (ref 4.31–10.16)

## 2023-04-24 RX ORDER — PANTOPRAZOLE SODIUM 40 MG/1
40 TABLET, DELAYED RELEASE ORAL ONCE
Status: DISCONTINUED | OUTPATIENT
Start: 2023-04-24 | End: 2023-04-24 | Stop reason: HOSPADM

## 2023-04-24 RX ORDER — PANTOPRAZOLE SODIUM 40 MG/1
40 TABLET, DELAYED RELEASE ORAL DAILY
Qty: 30 TABLET | Refills: 1 | Status: SHIPPED | OUTPATIENT
Start: 2023-04-24

## 2023-04-24 NOTE — ED PROVIDER NOTES
History  Chief Complaint   Patient presents with   • Chest Pain     Chest discomfort for about 4 weeks  Worse over past couple of weeks  Some shortness of breath     36 yo male c/o lower substernal chest heaviness x 4 weeks  Initially off and on randomly but now constant for the last few days  Worse with eating   + associated feeling like its hard to take a deep breath off and on for 4 weeks  No nausea, vomiting   + diarrhea  Pt  Is an ex-smoker  He is adopted so doesn't know family history  He does not have h/o DM, HTN or high cholesterol  [place] the city but no other travel  No leg pain  Sometimes he gets a sharp shooting numbness from left shoulder down to left elbow  He has also noted a lot of heartburn, really bad last night  No relief with TUMS, pepcid  History provided by:  Patient   used: No    Chest Pain  Associated symptoms: shortness of breath    Associated symptoms: no abdominal pain, no back pain, no cough, no dizziness, no fever, no headache, no nausea and not vomiting        Prior to Admission Medications   Prescriptions Last Dose Informant Patient Reported? Taking?    ALPRAZolam (XANAX) 0 5 mg tablet   No No   Sig: Daily prn   valACYclovir (VALTREX) 1,000 mg tablet   Yes No   Sig: TK 2 TS PO BID FOR 1 DAY   Patient not taking: Reported on 6/30/2022   zolpidem (AMBIEN) 10 mg tablet Not Taking  Yes No   Sig: TK 1 T PO HS IF NEEDED FOR SLEEP   Patient not taking: Reported on 4/24/2023      Facility-Administered Medications: None       Past Medical History:   Diagnosis Date   • Cancer ([facility] ) 2015    hodgkins lymphoma   • Hodgkin's lymphoma ([facility] )    • Sleep disorder     trouble falling asleep and staying asleep       Past Surgical History:   Procedure Laterality Date   • APPENDECTOMY     • [place] SURGICAL ARTHROSCOPY SHOULDER BICEPS TENODESIS Left 9/8/2020    Procedure: shoulder arthroscopy superior labral repair;  Surgeon: Lucho Esparza MD;  Location: [address] [place] [address]; Service: Orthopedics   • THORACOSCOPY VIDEO ASSISTED SURGERY (VATS)         Family History   Adopted: Yes     I have reviewed and agree with the history as documented  E-Cigarette/Vaping   • E-Cigarette Use Never User      E-Cigarette/Vaping Substances   • Nicotine No    • THC No    • CBD No    • Flavoring No    • Other No    • Unknown No      Social History     Tobacco Use   • Smoking status: Former     Packs/day: 0 50     Years: 15 00     Pack years: 7 50     Types: Cigarettes     Quit date: 2011     Years since quittin 6   • Smokeless tobacco: Never   Vaping Use   • Vaping Use: Never used   Substance Use Topics   • Alcohol use: Yes     Alcohol/week: 2 0 standard drinks     Types: 2 Cans of beer per week     Comment: monthly   • Drug use: Yes     Types: Marijuana     Comment: rarely uses for pain       Review of Systems   Constitutional: Negative  Negative for chills and fever  HENT: Negative  Negative for congestion and sore throat  Eyes: Negative  Respiratory: Positive for chest tightness and shortness of breath  Negative for cough  Cardiovascular: Positive for chest pain  Negative for leg swelling  Gastrointestinal: Positive for diarrhea  Negative for abdominal pain, nausea and vomiting  Genitourinary: Negative  Negative for dysuria, flank pain and hematuria  Musculoskeletal: Negative  Negative for back pain and myalgias  Skin: Negative  Negative for rash and wound  Neurological: Negative  Negative for dizziness and headaches  Psychiatric/Behavioral: Negative  Negative for confusion and hallucinations  The patient is not nervous/anxious  All other systems reviewed and are negative  Physical Exam  Physical Exam  Vitals and nursing note reviewed  Constitutional:       General: He is not in acute distress  Appearance: He is well-developed  He is not ill-appearing or diaphoretic  HENT:      Head: Normocephalic and atraumatic        Mouth/Throat:      Mouth: Mucous membranes are moist       Pharynx: Oropharynx is clear  Eyes:      General: No scleral icterus  Conjunctiva/sclera: Conjunctivae normal    Cardiovascular:      Rate and Rhythm: Normal rate and regular rhythm  Heart sounds: Normal heart sounds  No murmur heard  Pulmonary:      Effort: Pulmonary effort is normal  No respiratory distress  Breath sounds: Normal breath sounds  Chest:      Chest wall: No tenderness  Abdominal:      General: Bowel sounds are normal  There is no distension  Palpations: Abdomen is soft  Tenderness: There is no abdominal tenderness  There is no right CVA tenderness or left CVA tenderness  Musculoskeletal:         General: No tenderness or deformity  Normal range of motion  Cervical back: Normal range of motion and neck supple  Right lower leg: No edema  Left lower leg: No edema  Skin:     General: Skin is warm and dry  Coloration: Skin is not pale  Findings: No erythema or rash  Neurological:      General: No focal deficit present  Mental Status: He is alert and oriented to person, place, and time  Cranial Nerves: No cranial nerve deficit     Psychiatric:         Mood and Affect: Mood normal          Behavior: Behavior normal          Vital Signs  ED Triage Vitals [04/24/23 1519]   Temperature Pulse Respirations Blood Pressure SpO2   99 2 °F (37 3 °C) 86 22 123/81 97 %      Temp Source Heart Rate Source Patient Position - Orthostatic VS BP Location FiO2 (%)   Tympanic Monitor Sitting Right arm --      Pain Score       8           Vitals:    04/24/23 1519   BP: 123/81   Pulse: 86   Patient Position - Orthostatic VS: Sitting         Visual Acuity      ED Medications  Medications - No data to display    Diagnostic Studies  Results Reviewed     Procedure Component Value Units Date/Time    HS Troponin 0hr (reflex protocol) [[id number]]  (Normal) Collected: 04/24/23 1549    Lab Status: Final result Specimen: Blood from Line, Venous Updated: 04/24/23 1624     hs TnI 0hr 2 ng/L     B-Type Natriuretic Peptide(BNP) [[id number]]  (Normal) Collected: 04/24/23 1549    Lab Status: Final result Specimen: Blood from Line, Venous Updated: 04/24/23 1623     BNP 5 pg/mL     Comprehensive metabolic panel [[id number]] Collected: 04/24/23 1549    Lab Status: Final result Specimen: Blood from Line, Venous Updated: 04/24/23 1615     Sodium 136 mmol/L      Potassium 3 7 mmol/L      Chloride 100 mmol/L      CO2 30 mmol/L      ANION GAP 6 mmol/L      BUN 12 mg/dL      Creatinine 0 94 mg/dL      Glucose 105 mg/dL      Calcium 9 7 mg/dL      AST 19 U/L      ALT 28 U/L      Alkaline Phosphatase 71 U/L      Total Protein 7 5 g/dL      Albumin 4 5 g/dL      Total Bilirubin 0 83 mg/dL      eGFR 97 ml/min/1 73sq m     Narrative:      [place] guidelines for Chronic Kidney Disease (CKD):   •  Stage 1 with normal or high GFR (GFR > 90 mL/min/1 73 square meters)  •  Stage 2 Mild CKD (GFR = 60-89 mL/min/1 73 square meters)  •  Stage 3A Moderate CKD (GFR = 45-59 mL/min/1 73 square meters)  •  Stage 3B Moderate CKD (GFR = 30-44 mL/min/1 73 square meters)  •  Stage 4 Severe CKD (GFR = 15-29 mL/min/1 73 square meters)  •  Stage 5 End Stage CKD (GFR <15 mL/min/1 73 square meters)  Note: GFR calculation is accurate only with a steady state creatinine    Protime-INR [[id number]]  (Normal) Collected: 04/24/23 1549    Lab Status: Final result Specimen: Blood from Line, Venous Updated: 04/24/23 1610     Protime 12 9 seconds      INR 0 97    APTT [[id number]]  (Normal) Collected: 04/24/23 1549    Lab Status: Final result Specimen: Blood from Line, Venous Updated: 04/24/23 1610     PTT 28 seconds     CBC and differential [[id number]]  (Abnormal) Collected: 04/24/23 1549    Lab Status: Final result Specimen: Blood from Line, Venous Updated: 04/24/23 1557     WBC 7 90 Thousand/uL      RBC 5 14 Million/uL      Hemoglobin 15 4 g/dL      Hematocrit 44 5 % MCV 87 fL      MCH 30 0 pg      MCHC 34 6 g/dL      RDW 12 3 %      MPV 9 2 fL      Platelets 243 Thousands/uL      nRBC 0 /100 WBCs      Neutrophils Relative 82 %      Immat GRANS % 0 %      Lymphocytes Relative 10 %      Monocytes Relative 6 %      Eosinophils Relative 2 %      Basophils Relative 0 %      Neutrophils Absolute 6 43 Thousands/µL      Immature Grans Absolute 0 03 Thousand/uL      Lymphocytes Absolute 0 82 Thousands/µL      Monocytes Absolute 0 47 Thousand/µL      Eosinophils Absolute 0 13 Thousand/µL      Basophils Absolute 0 02 Thousands/µL                  XR chest 1 view portable   ED Interpretation by Caty Amezcua MD (16/11 [id number])   NAD                 Procedures  ECG 12 Lead Documentation Only    Date/Time: 4/24/2023 3:30 PM  Performed by: Caty Amezcua MD  Authorized by: Caty Amezcua MD     Indications / Diagnosis:  Chest pain  ECG reviewed by me, the ED Provider: yes    Patient location:  ED  Previous ECG:     Previous ECG:  Compared to current    Similarity:  No change  Interpretation:     Interpretation: abnormal    Rate:     ECG rate:  76    ECG rate assessment: normal    Rhythm:     Rhythm: sinus rhythm    Ectopy:     Ectopy: none    QRS:     QRS axis:  Left  Conduction:     Conduction: normal    ST segments:     ST segments:  Normal  T waves:     T waves: normal               ED Course             HEART Risk Score    Flowsheet Row Most Recent Value   Heart Score Risk Calculator    History 0 Filed at: 04/24/2023 1634   ECG 0 Filed at: 04/24/2023 1634   Age 0 Filed at: 04/24/2023 1634   Risk Factors 1 Filed at: 04/24/2023 1634   Troponin 0 Filed at: 04/24/2023 1634   HEART Score 1 Filed at: 04/24/2023 1634                        SBIRT 22yo+    Flowsheet Row Most Recent Value   Initial Alcohol Screen: US AUDIT-C     1  How often do you have a drink containing alcohol? 2 Filed at: 04/24/2023 1522   2  How many drinks containing alcohol do you have on a typical day you are drinking?   1 Filed at: 04/24/2023 1522   3a  Male UNDER 65: How often do you have five or more drinks on one occasion? 0 Filed at: 04/24/2023 1522   Audit-C Score 3 Filed at: 04/24/2023 1522   MARY: How many times in the past year have you    Used an illegal drug or used a prescription medication for non-medical reasons? Never Filed at: 04/24/2023 7900                    Medical Decision Making  External records reviewed  Pt  Did see his hematologist at [facility] 24 days ago, c/o stress, heartburn, fatigue and given referral for PCP  Wife had called [facility] today it appears and told to go to ER  EKG and trop normal, heart score 1  Likely non-cardiac chest pain  Will try course of protonix, ambulatory referral to cardiology and GI, stressed need to establish PCP  Amount and/or Complexity of Data Reviewed  Labs: ordered  Radiology: ordered and independent interpretation performed  Disposition  Final diagnoses:   Atypical chest pain   GERD (gastroesophageal reflux disease)     Time reflects when diagnosis was documented in both MDM as applicable and the Disposition within this note     Time User Action Codes Description Comment    0/50/6596  2:09 PM Carlos OCASIO Add [Q53 12] Atypical chest pain     5/07/6601  2:45 PM Carlos OCASIO Add [O54 7] GERD (gastroesophageal reflux disease)       ED Disposition     ED Disposition   Discharge    Condition   Stable    Date/Time   Mon Apr 24, 2023  4:37 PM    Comment   Javy Garrison discharge to home/self care                 Follow-up Information     Follow up With Specialties Details Why [address] [place] [address] Schedule an appointment as soon as possible for a visit   [address] [place] [address]  [phone]            Discharge Medication List as of 4/24/2023  4:41 PM      START taking these medications    Details   pantoprazole (PROTONIX) 40 mg tablet Take 1 tablet (40 mg total) by mouth daily, Starting Mon 4/24/2023, Normal         CONTINUE these medications which have NOT CHANGED    Details   ALPRAZolam (XANAX) 0 5 mg tablet Daily prn, Print      valACYclovir (VALTREX) 1,000 mg tablet TK 2 TS PO BID FOR 1 DAY, Historical Med      zolpidem (AMBIEN) 10 mg tablet TK 1 T PO HS IF NEEDED FOR SLEEP, Historical Med                 PDMP Review       Value Time User    PDMP Reviewed  Yes 9/8/2020  3:02 PM Henrietta Burnett MD          ED Provider  Electronically Signed by           Ezio Vasquez MD  84/23/30 5468

## 2023-04-24 NOTE — TELEPHONE ENCOUNTER
Pts wife called and asked for an appt or suggestions  She said pt is having chest pain, pain in the leg arm, heartburn, and SOB  [place] in the city  He is not a current pt of ours  He saw Veronica Abernathy in the past  I told wife pt should go to an ER in the city to get an evaluation of symptoms  She said he wont do that  She said she will take him to an urgent care if we think he needs to go  I said he should not drive home from the city  He should be seen there  She said he wont do that  I just wanted to document and send it to a provider to make you aware

## 2023-04-24 NOTE — DISCHARGE INSTRUCTIONS
Your tests are normal at this time but you need further outpatient testing  Please establish a primary care physician and make appointment  Referrals for cardiology and GI have been placed  Try the protonix as prescribed and you should use Maalox as needed for heartburn symptoms

## 2023-04-26 LAB
ATRIAL RATE: 76 BPM
P AXIS: 33 DEGREES
PR INTERVAL: 152 MS
QRS AXIS: -36 DEGREES
QRSD INTERVAL: 90 MS
QT INTERVAL: 358 MS
QTC INTERVAL: 402 MS
T WAVE AXIS: 27 DEGREES
VENTRICULAR RATE: 76 BPM

## 2023-12-26 ENCOUNTER — OFFICE VISIT (OUTPATIENT)
Dept: URGENT CARE | Facility: CLINIC | Age: 43
End: 2023-12-26
Payer: COMMERCIAL

## 2023-12-26 VITALS
WEIGHT: 247 LBS | TEMPERATURE: 97.6 F | RESPIRATION RATE: 18 BRPM | SYSTOLIC BLOOD PRESSURE: 126 MMHG | BODY MASS INDEX: 34.58 KG/M2 | OXYGEN SATURATION: 98 % | DIASTOLIC BLOOD PRESSURE: 84 MMHG | HEART RATE: 81 BPM | HEIGHT: 71 IN

## 2023-12-26 DIAGNOSIS — J02.9 SORE THROAT: Primary | ICD-10-CM

## 2023-12-26 LAB — S PYO AG THROAT QL: NEGATIVE

## 2023-12-26 PROCEDURE — 87636 SARSCOV2 & INF A&B AMP PRB: CPT | Performed by: PREVENTIVE MEDICINE

## 2023-12-26 PROCEDURE — 99213 OFFICE O/P EST LOW 20 MIN: CPT | Performed by: PREVENTIVE MEDICINE

## 2023-12-26 PROCEDURE — 87147 CULTURE TYPE IMMUNOLOGIC: CPT | Performed by: PREVENTIVE MEDICINE

## 2023-12-26 PROCEDURE — 87880 STREP A ASSAY W/OPTIC: CPT | Performed by: PREVENTIVE MEDICINE

## 2023-12-26 PROCEDURE — 87070 CULTURE OTHR SPECIMN AEROBIC: CPT | Performed by: PREVENTIVE MEDICINE

## 2023-12-26 NOTE — PATIENT INSTRUCTIONS
Hot salt water gargles may be helpful  Cepacol lozenges for pain  Motrin or Tylenol for fever, chills or aches  Follow up with PCP in 3-5 days if no improvement in symptoms  Your COVID flu test will be back tomorrow on MyChart your culture strep will be back in 48 hours

## 2023-12-26 NOTE — PROGRESS NOTES
[facility]        NAME: Brian Garrison is a 43 y.o. male  : 1980    MRN: [id number]  DATE: 2023  TIME: 3:21 PM    Assessment and Plan   Sore throat [J02.9]  1. Sore throat              Patient Instructions       Follow up with PCP in 3-5 days.  Proceed to  ER if symptoms worsen.    Chief Complaint     Chief Complaint   Patient presents with    Cold Like Symptoms     Pt here ill x 5 days  pt states   sore throat, diarrhea, headache, cough. Covid test was neg.  Pt used Dayquil, Nyquil and Tylenol.          History of Present Illness       Sore throat diarrhea fatigue x 3 to 5 days        Review of Systems   Review of Systems   HENT:  Positive for sore throat.          Current Medications       Current Outpatient Medications:     ALPRAZolam (XANAX) 0.5 mg tablet, Daily prn, Disp: 10 tablet, Rfl: 0    pantoprazole (PROTONIX) 40 mg tablet, Take 1 tablet (40 mg total) by mouth daily, Disp: 30 tablet, Rfl: 1    valACYclovir (VALTREX) 1,000 mg tablet, , Disp: , Rfl:     Current Allergies     Allergies as of 2023    (No Known Allergies)            The following portions of the patient's history were reviewed and updated as appropriate: allergies, current medications, past family history, past medical history, past social history, past surgical history and problem list.     Past Medical History:   Diagnosis Date    Cancer (HCC) 2015    hodgkins lymphoma    Hodgkin's lymphoma (HCC)     Sleep disorder     trouble falling asleep and staying asleep       Past Surgical History:   Procedure Laterality Date    APPENDECTOMY      [facility] SURGICAL ARTHROSCOPY SHOULDER BICEPS TENODESIS Left 2020    Procedure: shoulder arthroscopy superior labral repair;  Surgeon: Yonas Bee MD;  Location: [facility];  Service: Orthopedics    THORACOSCOPY VIDEO ASSISTED SURGERY (VATS)         Family History   Adopted: Yes         Medications have been verified.        Objective   /84   Pulse 81   Temp  "97.6 °F (36.4 °C)   Resp 18   Ht 5' 11\" (1.803 m)   Wt 112 kg (247 lb)   SpO2 98%   BMI 34.45 kg/m²   No LMP for male patient.       Physical Exam     Physical Exam  HENT:      Mouth/Throat:      Mouth: Mucous membranes are moist.      Pharynx: Oropharynx is clear. Posterior oropharyngeal erythema present. No oropharyngeal exudate.     Rapid strep negative              "

## 2023-12-27 LAB
FLUAV RNA RESP QL NAA+PROBE: NEGATIVE
FLUBV RNA RESP QL NAA+PROBE: NEGATIVE
SARS-COV-2 RNA RESP QL NAA+PROBE: NEGATIVE

## 2023-12-28 ENCOUNTER — OFFICE VISIT (OUTPATIENT)
Dept: FAMILY MEDICINE CLINIC | Facility: CLINIC | Age: 43
End: 2023-12-28
Payer: COMMERCIAL

## 2023-12-28 VITALS
DIASTOLIC BLOOD PRESSURE: 80 MMHG | HEIGHT: 71 IN | RESPIRATION RATE: 16 BRPM | BODY MASS INDEX: 34.58 KG/M2 | TEMPERATURE: 97.2 F | OXYGEN SATURATION: 99 % | SYSTOLIC BLOOD PRESSURE: 120 MMHG | WEIGHT: 247 LBS | HEART RATE: 74 BPM

## 2023-12-28 DIAGNOSIS — J03.90 TONSILLITIS: Primary | ICD-10-CM

## 2023-12-28 DIAGNOSIS — J06.9 VIRAL URI: ICD-10-CM

## 2023-12-28 PROBLEM — J01.40 ACUTE PANSINUSITIS: Status: RESOLVED | Noted: 2020-01-09 | Resolved: 2023-12-28

## 2023-12-28 PROBLEM — Z00.00 ROUTINE PHYSICAL EXAMINATION: Status: RESOLVED | Noted: 2018-05-09 | Resolved: 2023-12-28

## 2023-12-28 PROBLEM — Z90.49 HISTORY OF APPENDECTOMY: Status: RESOLVED | Noted: 2018-05-09 | Resolved: 2023-12-28

## 2023-12-28 PROCEDURE — 99214 OFFICE O/P EST MOD 30 MIN: CPT | Performed by: NURSE PRACTITIONER

## 2023-12-28 RX ORDER — AMOXICILLIN 875 MG/1
875 TABLET, COATED ORAL 2 TIMES DAILY
Qty: 14 TABLET | Refills: 0 | Status: SHIPPED | OUTPATIENT
Start: 2023-12-28 | End: 2024-01-04

## 2023-12-28 NOTE — PATIENT INSTRUCTIONS
Do not start Amoxicillin 875mg unless symptoms recur.   If you develop recurring symptoms, treat as discussed:  Tylenol or Motrin as needed for pain or fever.   Salt water gargles. Lozenges. Chloraseptic spray as needed.   Maintain adequate hydration/fluids.   Antibiotics as ordered. Make sure to finish entire antibiotic course as ordered.   Change toothbrush after being on the antibiotics for 48 hours to avoid re-infection.

## 2023-12-28 NOTE — PROGRESS NOTES
"Name: Brian Garrison      : 1980      MRN: [id number]  Encounter Provider: RAMIRO Garcia  Encounter Date: 2023   Encounter department: [facility]    Assessment & Plan   1. Tonsillitis  Suspect pt did have strep and was not picked up on swab based on pt's description of swabbing.   Symptoms have improved after one dose of amoxicillin but concerned, that may have rebound since only had one dose of abx.   Repeat swab today will most likely negative because pt has treated self with dose of abx.   Will give rx for abx that pt can start if symptoms recur but counseled not to start unless symptoms return, worsen, develops fevers, etc.  Pt agreeable.   Given instructions as to manage strep symptoms if develops same.   - amoxicillin (AMOXIL) 875 mg tablet; Take 1 tablet (875 mg total) by mouth 2 (two) times a day for 7 days  Dispense: 14 tablet; Refill: 0    2. Viral URI  Fluids. Rest. Nasal saline. Supportive care for symptom management.  Tylenol or ibuprofen as needed for fever or discomfort.  Use a cool mist humidifier at bedtime.   Antibiotics are not indicated at this time.   Symptoms may persist for up to 14 days.         Depression Screening and Follow-up Plan: Patient was screened for depression during today's encounter. They screened negative with a PHQ-2 score of 0.      Subjective      Here for sore throat and congestion for one week  Seen in urgent care on . Rapid strep and covid/flu negative. Was upset that he didn't feel that when his throat was swabbed, they didn't get to the tonsils. States that prior to going to the urgent care, \"felt like had shards of glass in throat\" and tonsils were red, swollen, and covered in \"white\". Was running fevers intermittently  Reports history of strep in past and felt the same.   Pt states when throat was swabbed, \"they literally only swabbed my tongue\"  Symptoms started about a week prior to Phill  Had leftover amoxicillin " "and started feeling better. Only had one dose (875mg) and took it.   Sore throat improving. Still getting headaches. Thick mucous. PND. \"Cold like\" symptoms started few days ago.   No fevers last few days.           Review of Systems   Constitutional:  Negative for fever (resolved, but none currently).   HENT:  Positive for congestion, postnasal drip and sore throat.    Respiratory:  Negative for cough.    Skin:  Negative for rash.   Allergic/Immunologic: Negative for immunocompromised state.   Neurological:  Positive for headaches.   Hematological:  Negative for adenopathy.       Current Outpatient Medications on File Prior to Visit   Medication Sig    ALPRAZolam (XANAX) 0.5 mg tablet Daily prn    pantoprazole (PROTONIX) 40 mg tablet Take 1 tablet (40 mg total) by mouth daily    valACYclovir (VALTREX) 1,000 mg tablet        Objective     /80 (BP Location: Right arm, Patient Position: Sitting, Cuff Size: Large)   Pulse 74   Temp (!) 97.2 °F (36.2 °C)   Resp 16   Ht 5' 11\" (1.803 m)   Wt 112 kg (247 lb)   SpO2 99%   BMI 34.45 kg/m²     Physical Exam  Vitals reviewed.   Constitutional:       General: He is not in acute distress.     Appearance: He is not ill-appearing.   HENT:      Right Ear: Tympanic membrane and ear canal normal.      Left Ear: Tympanic membrane and ear canal normal.      Nose: Congestion present.      Mouth/Throat:      Mouth: Mucous membranes are moist.      Pharynx: Oropharynx is clear. No oropharyngeal exudate or posterior oropharyngeal erythema.      Comments: Tonsils enlarged  Eyes:      General:         Right eye: No discharge.         Left eye: No discharge.   Cardiovascular:      Rate and Rhythm: Normal rate and regular rhythm.   Pulmonary:      Effort: Pulmonary effort is normal. No respiratory distress.      Breath sounds: Normal breath sounds. No wheezing.   Lymphadenopathy:      Cervical: No cervical adenopathy.   Skin:     General: Skin is warm and dry.      Coloration: " Skin is not jaundiced or pale.   Neurological:      General: No focal deficit present.      Mental Status: He is alert and oriented to person, place, and time.      Cranial Nerves: No cranial nerve deficit.      Sensory: No sensory deficit.   Psychiatric:         Mood and Affect: Mood normal.         Behavior: Behavior normal.         Thought Content: Thought content normal.         Judgment: Judgment normal.       RAMIRO Garcia

## 2023-12-30 LAB — BACTERIA THROAT CULT: ABNORMAL

## 2024-08-15 ENCOUNTER — TELEPHONE (OUTPATIENT)
Age: 44
End: 2024-08-15

## 2024-08-15 ENCOUNTER — OFFICE VISIT (OUTPATIENT)
Dept: FAMILY MEDICINE CLINIC | Facility: CLINIC | Age: 44
End: 2024-08-15
Payer: COMMERCIAL

## 2024-08-15 VITALS
TEMPERATURE: 98.2 F | RESPIRATION RATE: 16 BRPM | WEIGHT: 245 LBS | HEIGHT: 71 IN | DIASTOLIC BLOOD PRESSURE: 80 MMHG | HEART RATE: 64 BPM | BODY MASS INDEX: 34.3 KG/M2 | OXYGEN SATURATION: 95 % | SYSTOLIC BLOOD PRESSURE: 120 MMHG

## 2024-08-15 DIAGNOSIS — Z85.71 HISTORY OF HODGKIN'S LYMPHOMA: ICD-10-CM

## 2024-08-15 DIAGNOSIS — Z13.220 SCREENING CHOLESTEROL LEVEL: ICD-10-CM

## 2024-08-15 DIAGNOSIS — E78.5 HYPERLIPIDEMIA, UNSPECIFIED HYPERLIPIDEMIA TYPE: ICD-10-CM

## 2024-08-15 DIAGNOSIS — Z92.3 HISTORY OF RADIATION THERAPY: ICD-10-CM

## 2024-08-15 DIAGNOSIS — Z13.1 SCREENING FOR DIABETES MELLITUS: ICD-10-CM

## 2024-08-15 DIAGNOSIS — Z00.00 PHYSICAL EXAM: Primary | ICD-10-CM

## 2024-08-15 DIAGNOSIS — Z13.29 SCREENING FOR THYROID DISORDER: ICD-10-CM

## 2024-08-15 DIAGNOSIS — M25.50 ARTHRALGIA, UNSPECIFIED JOINT: ICD-10-CM

## 2024-08-15 DIAGNOSIS — Z13.0 SCREENING FOR DEFICIENCY ANEMIA: ICD-10-CM

## 2024-08-15 PROCEDURE — 99396 PREV VISIT EST AGE 40-64: CPT | Performed by: FAMILY MEDICINE

## 2024-08-15 NOTE — PROGRESS NOTES
Adult Annual Physical  Name: Brian Garrison      : 1980      MRN: [id number]  Encounter Provider: Samantha Pederson MD  Encounter Date: 8/15/2024   Encounter department: [facility]    Assessment & Plan   1. Physical exam  -     CBC and differential; Future  -     Comprehensive metabolic panel; Future  -     Hemoglobin A1C; Future  -     Urinalysis with microscopic; Future  -     CBC and differential  -     Comprehensive metabolic panel  -     Hemoglobin A1C  -     Urinalysis with microscopic  2. Screening for thyroid disorder  -     TSH, 3rd generation with Free T4 reflex; Future  -     Urinalysis with microscopic; Future  -     TSH, 3rd generation with Free T4 reflex  -     Urinalysis with microscopic  3. Screening for deficiency anemia  -     CBC and differential; Future  -     CBC and differential  4. Screening cholesterol level  -     Lipid panel; Future  -     Lipid panel  5. Screening for diabetes mellitus  -     Comprehensive metabolic panel; Future  -     Hemoglobin A1C; Future  -     Comprehensive metabolic panel  -     Hemoglobin A1C  6. Hyperlipidemia, unspecified hyperlipidemia type  -     Lipid panel; Future  -     Lipid panel  7. History of Hodgkin's lymphoma  8. History of radiation therapy  9. Arthralgia, unspecified joint  -     DENISSE w/Reflex if Positive; Future  -     DENISSE w/Reflex if Positive      Protonix AM; Pepcid PM; recommend trialing to see if this controls his acid reflux.  If no improvement please let us know.  Continue care from oncology/hematology  History of arthritic pains recommend acute rheumatoid workup  Continue seeing orthopedic       History of Present Illness     Adult Annual Physical:  Patient presents for annual physical. Meat, chinese food-> [facility] has done EGD x 3. Biopsy came back inconclusive, x2 biopsy to many WBCS, Has been taking Protonix but 2 tablets is the only things that helps   2015-> Hodgkins in 2015 Efe Rosenbergone->  "shoulder and elbow. Reachs to grab remote and symptoms shooting up. Next week.     Diet and Physical Activity:  - Diet/Nutrition: well balanced diet.    Depression Screening:  - PHQ-2 Score: 0    General Health:  - Sleep: sleeps well.  - Hearing: normal hearing right ear and normal hearing left ear.    Review of Systems   Constitutional:  Negative for activity change, appetite change, chills, fatigue and fever.   HENT:  Negative for congestion.    Respiratory:  Negative for cough, chest tightness and shortness of breath.    Cardiovascular:  Negative for chest pain and leg swelling.   Gastrointestinal:  Negative for abdominal distention, abdominal pain, constipation, diarrhea, nausea and vomiting.   Musculoskeletal:  Positive for arthralgias.   All other systems reviewed and are negative.        Objective     /80 (BP Location: Left arm, Patient Position: Sitting, Cuff Size: Adult)   Pulse 64   Temp 98.2 °F (36.8 °C) (Temporal)   Resp 16   Ht 5' 11\" (1.803 m)   Wt 111 kg (245 lb)   SpO2 95%   BMI 34.17 kg/m²     Physical Exam  Vitals reviewed.   Constitutional:       Appearance: He is well-developed.   HENT:      Head: Normocephalic and atraumatic.      Right Ear: Tympanic membrane, ear canal and external ear normal.      Left Ear: Tympanic membrane, ear canal and external ear normal.      Nose: Nose normal.      Mouth/Throat:      Mouth: Mucous membranes are moist.   Eyes:      Conjunctiva/sclera: Conjunctivae normal.      Pupils: Pupils are equal, round, and reactive to light.   Cardiovascular:      Rate and Rhythm: Normal rate and regular rhythm.      Heart sounds: Normal heart sounds.   Pulmonary:      Effort: Pulmonary effort is normal.      Breath sounds: Normal breath sounds. No wheezing.   Abdominal:      General: Bowel sounds are normal. There is no distension.      Palpations: Abdomen is soft. There is no mass.      Tenderness: There is no abdominal tenderness.   Musculoskeletal:         " General: No tenderness. Normal range of motion.      Cervical back: Normal range of motion and neck supple.   Skin:     General: Skin is warm.      Capillary Refill: Capillary refill takes less than 2 seconds.   Neurological:      Mental Status: He is alert and oriented to person, place, and time.      Cranial Nerves: No cranial nerve deficit.      Sensory: No sensory deficit.      Motor: No abnormal muscle tone.      Coordination: Coordination normal.      Deep Tendon Reflexes: Reflexes normal.   Psychiatric:         Behavior: Behavior normal.         Thought Content: Thought content normal.         Judgment: Judgment normal.

## 2024-08-15 NOTE — TELEPHONE ENCOUNTER
Pt called requesting to speak with someone at the office regarding his appt today. I was unable to complete a warm transfer to the office.     Please advise.

## 2024-08-19 ENCOUNTER — TELEPHONE (OUTPATIENT)
Dept: ADMINISTRATIVE | Facility: OTHER | Age: 44
End: 2024-08-19

## 2024-08-19 NOTE — LETTER
Procedure Request Form: Colonoscopy      Date Requested: 24  Patient: Brian Fugitt  Patient : 1980   Referring Provider: Samantha Pederson MD        Date of Procedure __________most recent report____________________       The above patient has informed us that they have completed their   most recent Colonoscopy at your facility. Please complete   this form and attach all corresponding procedure reports/results.    Comments __________________________________________________________  ____________________________________________________________________  ____________________________________________________________________  ____________________________________________________________________    Facility Completing Procedure _________________________________________    Form Completed By (print name) _______________________________________      Signature __________________________________________________________      These reports are needed for  compliance.    Please fax this completed form and a copy of the procedure report to our office located at [address] [place] [address] as soon as possible to Fax [phone] attention Daniel: Phone [phone]    We thank you for your assistance in treating our mutual patient.

## 2024-08-19 NOTE — TELEPHONE ENCOUNTER
----- Message from Samantha Cornell MD sent at 8/18/2024  9:17 PM EDT -----  Regarding: care gap request  08/18/24 9:17 PM    Hello, our patient attached above has had CRC: Colonoscopy completed/performed. Please assist in updating the patient chart by making an External outreach to [facility]  [place]   Thank you,  Samantha GRULLON

## 2024-08-19 NOTE — TELEPHONE ENCOUNTER
Upon review of the In Basket request and the patient's chart, initial outreach has been made via fax to facility. Please see Contacts section for details.     Thank you  Daniel Jonas MA

## 2024-08-22 NOTE — TELEPHONE ENCOUNTER
Upon review of the In Basket request we have found as a result of outreach that patient did not have the requested item(s) completed.     Any additional questions or concerns should be emailed to the Practice Liaisons via the appropriate education email address, please do not reply via In Basket.    Thank you  Daniel Jonas MA   [facility]

## 2024-08-24 LAB
ALBUMIN SERPL-MCNC: 4.5 G/DL (ref 4.1–5.1)
ALP SERPL-CCNC: 101 IU/L (ref 44–121)
ALT SERPL-CCNC: 28 IU/L (ref 0–44)
ANA SER QL: NEGATIVE
APPEARANCE UR: CLEAR
AST SERPL-CCNC: 18 IU/L (ref 0–40)
BACTERIA URNS QL MICRO: NORMAL
BASOPHILS # BLD AUTO: 0 X10E3/UL (ref 0–0.2)
BASOPHILS NFR BLD AUTO: 1 %
BILIRUB SERPL-MCNC: 0.4 MG/DL (ref 0–1.2)
BILIRUB UR QL STRIP: NEGATIVE
BUN SERPL-MCNC: 14 MG/DL (ref 6–24)
BUN/CREAT SERPL: 13 (ref 9–20)
CALCIUM SERPL-MCNC: 9.7 MG/DL (ref 8.7–10.2)
CASTS URNS QL MICRO: NORMAL /LPF
CHLORIDE SERPL-SCNC: 103 MMOL/L (ref 96–106)
CHOLEST SERPL-MCNC: 199 MG/DL (ref 100–199)
CHOLEST/HDLC SERPL: 5.4 RATIO (ref 0–5)
CO2 SERPL-SCNC: 25 MMOL/L (ref 20–29)
COLOR UR: YELLOW
CREAT SERPL-MCNC: 1.12 MG/DL (ref 0.76–1.27)
EGFR: 82 ML/MIN/1.73
EOSINOPHIL # BLD AUTO: 0.3 X10E3/UL (ref 0–0.4)
EOSINOPHIL NFR BLD AUTO: 5 %
EPI CELLS #/AREA URNS HPF: NORMAL /HPF (ref 0–10)
ERYTHROCYTE [DISTWIDTH] IN BLOOD BY AUTOMATED COUNT: 12.2 % (ref 11.6–15.4)
EST. AVERAGE GLUCOSE BLD GHB EST-MCNC: 120 MG/DL
GLOBULIN SER-MCNC: 2.5 G/DL (ref 1.5–4.5)
GLUCOSE SERPL-MCNC: 154 MG/DL (ref 70–99)
GLUCOSE UR QL: NEGATIVE
HBA1C MFR BLD: 5.8 % (ref 4.8–5.6)
HCT VFR BLD AUTO: 43.8 % (ref 37.5–51)
HDLC SERPL-MCNC: 37 MG/DL
HGB BLD-MCNC: 14.7 G/DL (ref 13–17.7)
HGB UR QL STRIP: NEGATIVE
IMM GRANULOCYTES # BLD: 0 X10E3/UL (ref 0–0.1)
IMM GRANULOCYTES NFR BLD: 0 %
KETONES UR QL STRIP: NEGATIVE
LDLC SERPL CALC-MCNC: 126 MG/DL (ref 0–99)
LEUKOCYTE ESTERASE UR QL STRIP: NEGATIVE
LYMPHOCYTES # BLD AUTO: 1.8 X10E3/UL (ref 0.7–3.1)
LYMPHOCYTES NFR BLD AUTO: 32 %
MCH RBC QN AUTO: 29 PG (ref 26.6–33)
MCHC RBC AUTO-ENTMCNC: 33.6 G/DL (ref 31.5–35.7)
MCV RBC AUTO: 86 FL (ref 79–97)
MICRO URNS: NORMAL
MICRO URNS: NORMAL
MICRODELETION SYND BLD/T FISH: NORMAL
MONOCYTES # BLD AUTO: 0.3 X10E3/UL (ref 0.1–0.9)
MONOCYTES NFR BLD AUTO: 5 %
NEUTROPHILS # BLD AUTO: 3.2 X10E3/UL (ref 1.4–7)
NEUTROPHILS NFR BLD AUTO: 57 %
NITRITE UR QL STRIP: NEGATIVE
PH UR STRIP: 5.5 [PH] (ref 5–7.5)
PLATELET # BLD AUTO: 290 X10E3/UL (ref 150–450)
POTASSIUM SERPL-SCNC: 4.6 MMOL/L (ref 3.5–5.2)
PROT SERPL-MCNC: 7 G/DL (ref 6–8.5)
PROT UR QL STRIP: NEGATIVE
RBC # BLD AUTO: 5.07 X10E6/UL (ref 4.14–5.8)
RBC #/AREA URNS HPF: NORMAL /HPF (ref 0–2)
SL AMB VLDL CHOLESTEROL CALC: 36 MG/DL (ref 5–40)
SODIUM SERPL-SCNC: 141 MMOL/L (ref 134–144)
SP GR UR: 1.02 (ref 1–1.03)
TRIGL SERPL-MCNC: 200 MG/DL (ref 0–149)
TSH SERPL DL<=0.005 MIU/L-ACNC: 1.13 UIU/ML (ref 0.45–4.5)
UROBILINOGEN UR STRIP-ACNC: 0.2 MG/DL (ref 0.2–1)
WBC # BLD AUTO: 5.5 X10E3/UL (ref 3.4–10.8)
WBC #/AREA URNS HPF: NORMAL /HPF (ref 0–5)

## 2024-09-03 ENCOUNTER — TELEPHONE (OUTPATIENT)
Dept: FAMILY MEDICINE CLINIC | Facility: CLINIC | Age: 44
End: 2024-09-03

## 2024-09-03 NOTE — TELEPHONE ENCOUNTER
----- Message from Samantha Cornell MD sent at 9/3/2024 12:16 AM EDT -----    Please advise the patient that I reviewed his blood work.  His glucose is elevated at 154 and his A1c is at 5.8 making him a prediabetic.  Please advise the patient that his triglycerides are elevated so this is likely coming from carbs and sugars from his diet.  I would like to repeat this in 6 months and have a make lifestyle modifications if he has any questions or concerns please let me know.

## 2024-09-05 ENCOUNTER — TELEPHONE (OUTPATIENT)
Dept: FAMILY MEDICINE CLINIC | Facility: CLINIC | Age: 44
End: 2024-09-05

## 2024-09-05 DIAGNOSIS — R73.03 PRE-DIABETES: Primary | ICD-10-CM

## 2024-09-05 NOTE — TELEPHONE ENCOUNTER
Left a voice message advising the patient that I will send in Ozempic.  His fasting glucose was 154 but his A1c was 5.8 which only makes him a prediabetic.  Usually these injectables do not get approved unless they are in diabetic status.  I will send in Ozempic but I do not want to make any promises in case it is not covered      ----- Message from Abimbola ANTHONY sent at 9/4/2024  3:04 PM EDT -----  Patient said you had stated that ozempic could be used to help if his number were high . He would like a call back if that is possible .Please advise  ----- Message -----  From: Samantha Cornell MD  Sent: 9/3/2024  12:16 AM EDT  To: [facility]      Please advise the patient that I reviewed his blood work.  His glucose is elevated at 154 and his A1c is at 5.8 making him a prediabetic.  Please advise the patient that his triglycerides are elevated so this is likely coming from carbs and sugars from his diet.  I would like to repeat this in 6 months and have a make lifestyle modifications if he has any questions or concerns please let me know.

## 2024-09-17 ENCOUNTER — TELEPHONE (OUTPATIENT)
Age: 44
End: 2024-09-17

## 2024-09-17 NOTE — TELEPHONE ENCOUNTER
Duplicate encounter created, please see telephone encounter from 9/5/2024 regarding ozempic PA status. Please review patient's chart to see if there is already an encounter regarding the medication in question and to document anything regarding this medication in regards to anything regarding the authorization process etc before creating another encounter Thank You.

## 2024-09-17 NOTE — TELEPHONE ENCOUNTER
The patients wife called she said the wegovy would need a prior authorization and she wanted to check the status please keep her updated thank you

## 2024-09-18 DIAGNOSIS — E78.5 HYPERLIPIDEMIA, UNSPECIFIED HYPERLIPIDEMIA TYPE: ICD-10-CM

## 2024-09-18 DIAGNOSIS — R73.03 PRE-DIABETES: Primary | ICD-10-CM

## 2024-09-18 RX ORDER — SEMAGLUTIDE 0.25 MG/.5ML
INJECTION, SOLUTION SUBCUTANEOUS
Qty: 2 ML | Refills: 0 | Status: SHIPPED | OUTPATIENT
Start: 2024-09-18

## 2024-10-07 DIAGNOSIS — R73.03 PRE-DIABETES: ICD-10-CM

## 2024-10-07 DIAGNOSIS — E78.5 HYPERLIPIDEMIA, UNSPECIFIED HYPERLIPIDEMIA TYPE: Primary | ICD-10-CM

## 2024-10-07 RX ORDER — SEMAGLUTIDE 0.5 MG/.5ML
INJECTION, SOLUTION SUBCUTANEOUS
Qty: 2 ML | Refills: 0 | Status: SHIPPED | OUTPATIENT
Start: 2024-10-07

## 2024-11-04 DIAGNOSIS — R73.03 PRE-DIABETES: ICD-10-CM

## 2024-11-04 DIAGNOSIS — E78.5 HYPERLIPIDEMIA, UNSPECIFIED HYPERLIPIDEMIA TYPE: Primary | ICD-10-CM

## 2024-11-04 RX ORDER — SEMAGLUTIDE 1 MG/.5ML
INJECTION, SOLUTION SUBCUTANEOUS
Qty: 2 ML | Refills: 0 | Status: SHIPPED | OUTPATIENT
Start: 2024-11-04

## 2024-11-15 ENCOUNTER — OFFICE VISIT (OUTPATIENT)
Dept: FAMILY MEDICINE CLINIC | Facility: CLINIC | Age: 44
End: 2024-11-15
Payer: COMMERCIAL

## 2024-11-15 VITALS
TEMPERATURE: 98 F | SYSTOLIC BLOOD PRESSURE: 102 MMHG | BODY MASS INDEX: 32.06 KG/M2 | OXYGEN SATURATION: 97 % | HEART RATE: 72 BPM | WEIGHT: 229 LBS | RESPIRATION RATE: 14 BRPM | HEIGHT: 71 IN | DIASTOLIC BLOOD PRESSURE: 72 MMHG

## 2024-11-15 DIAGNOSIS — R73.03 PRE-DIABETES: Primary | ICD-10-CM

## 2024-11-15 DIAGNOSIS — M25.521 RIGHT ELBOW PAIN: ICD-10-CM

## 2024-11-15 DIAGNOSIS — E78.5 HYPERLIPIDEMIA, UNSPECIFIED HYPERLIPIDEMIA TYPE: ICD-10-CM

## 2024-11-15 DIAGNOSIS — Z23 ENCOUNTER FOR IMMUNIZATION: ICD-10-CM

## 2024-11-15 PROCEDURE — 90656 IIV3 VACC NO PRSV 0.5 ML IM: CPT | Performed by: FAMILY MEDICINE

## 2024-11-15 PROCEDURE — 99214 OFFICE O/P EST MOD 30 MIN: CPT | Performed by: FAMILY MEDICINE

## 2024-11-15 PROCEDURE — 90471 IMMUNIZATION ADMIN: CPT | Performed by: FAMILY MEDICINE

## 2024-11-15 RX ORDER — SEMAGLUTIDE 1.7 MG/.75ML
INJECTION, SOLUTION SUBCUTANEOUS
Qty: 3 ML | Refills: 0 | Status: SHIPPED | OUTPATIENT
Start: 2024-11-15

## 2024-11-15 NOTE — PROGRESS NOTES
Brian Garrison 1980 male MRN: [id number]    [facility] OFFICE VISIT  [facility]      ASSESSMENT/PLAN  Brian Garrison is a 44 y.o. male presents to the office for    Diagnoses and all orders for this visit:    Pre-diabetes  -     Semaglutide-Weight Management (Wegovy) 1.7 MG/0.75ML; Inject 1.7 mg under the skin weekly    Hyperlipidemia, unspecified hyperlipidemia type  -     Semaglutide-Weight Management (Wegovy) 1.7 MG/0.75ML; Inject 1.7 mg under the skin weekly    Encounter for immunization  -     influenza vaccine preservative-free 0.5 mL IM (Fluzone, Afluria, Fluarix, Flulaval)       HOLD wegovy the week prior to surgery  Recommend that when we get to 2.4 that the patient get 3-month supply and stay there in order to maximize his weight loss  Right elbow surgery being performed  in [facility]  Flu vaccine given  Anxiety did discuss with the patient about possible SSRIs at this time he would like to avoid.  Continue using Xanax as needed               No future appointments.       SUBJECTIVE  CC: Weight Check      HPI:  Brian Garrison is a 44 y.o. male who presents for an acute appointment.  12/19/2024 [place] Right elbow-> Torn interstitial tear  Patient states that he feels agitated at times does not know if it is social home life.  Patient states that Xanax does help but just wants to know his other options.  Patient is stating that he has no side effects of the weight loss medication is very pleased with the success that he has been having  Review of Systems   Constitutional:  Negative for activity change, appetite change, chills, fatigue and fever.   HENT:  Negative for congestion.    Respiratory:  Negative for cough, chest tightness and shortness of breath.    Cardiovascular:  Negative for chest pain and leg swelling.   Gastrointestinal:  Negative for abdominal distention, abdominal pain, constipation, diarrhea, nausea and vomiting.   Musculoskeletal:   "Positive for arthralgias.   All other systems reviewed and are negative.      Historical Information   The patient history was reviewed as follows:  Past Medical History:   Diagnosis Date    Acute pansinusitis 1/9/2020    Cancer (HCC) 2015    hodgkins lymphoma    Headache(784.0) 12/18/2023    History of appendectomy 5/9/2018    Hodgkin's lymphoma (HCC)     Sleep disorder     trouble falling asleep and staying asleep         Medications:     Current Outpatient Medications:     ALPRAZolam (XANAX) 0.5 mg tablet, Daily prn, Disp: 10 tablet, Rfl: 0    pantoprazole (PROTONIX) 40 mg tablet, Take 1 tablet (40 mg total) by mouth daily, Disp: 30 tablet, Rfl: 1    Semaglutide-Weight Management (Wegovy) 1 MG/0.5ML, Inject 1 mg under the skin weekly, Disp: 2 mL, Rfl: 0    Semaglutide-Weight Management (Wegovy) 1.7 MG/0.75ML, Inject 1.7 mg under the skin weekly, Disp: 3 mL, Rfl: 0    valACYclovir (VALTREX) 1,000 mg tablet, , Disp: , Rfl:     No Known Allergies    OBJECTIVE  Vitals:   Vitals:    11/15/24 1307   BP: 102/72   BP Location: Left arm   Patient Position: Sitting   Cuff Size: Adult   Pulse: 72   Resp: 14   Temp: 98 °F (36.7 °C)   TempSrc: Temporal   SpO2: 97%   Weight: 104 kg (229 lb)   Height: 5' 11\" (1.803 m)         Physical Exam  Vitals reviewed.   Constitutional:       Appearance: He is well-developed.   HENT:      Head: Normocephalic and atraumatic.   Eyes:      Conjunctiva/sclera: Conjunctivae normal.      Pupils: Pupils are equal, round, and reactive to light.   Cardiovascular:      Rate and Rhythm: Normal rate and regular rhythm.      Heart sounds: Normal heart sounds, S1 normal and S2 normal. No murmur heard.  Pulmonary:      Effort: Pulmonary effort is normal. No respiratory distress.      Breath sounds: Normal breath sounds. No wheezing.   Musculoskeletal:         General: Normal range of motion.      Cervical back: Normal range of motion and neck supple.   Skin:     General: Skin is warm.   Neurological:     "  Mental Status: He is alert and oriented to person, place, and time.   Psychiatric:         Speech: Speech normal.         Behavior: Behavior normal.         Thought Content: Thought content normal.         Judgment: Judgment normal.                    Samantha Cornell MD,   [facility] [place]  11/15/2024

## 2024-12-30 ENCOUNTER — TELEPHONE (OUTPATIENT)
Age: 44
End: 2024-12-30

## 2024-12-30 DIAGNOSIS — R73.03 PRE-DIABETES: Primary | ICD-10-CM

## 2024-12-30 RX ORDER — SEMAGLUTIDE 2.4 MG/.75ML
INJECTION, SOLUTION SUBCUTANEOUS
Qty: 3 ML | Refills: 0 | Status: SHIPPED | OUTPATIENT
Start: 2024-12-30

## 2024-12-30 NOTE — TELEPHONE ENCOUNTER
PT states he was speaking with Bettye when he got disconnected. I was unable to reach the office. Please call PT back at the earliest convenience.    ThankYou

## 2025-01-06 ENCOUNTER — TELEMEDICINE (OUTPATIENT)
Dept: FAMILY MEDICINE CLINIC | Facility: CLINIC | Age: 45
End: 2025-01-06
Payer: COMMERCIAL

## 2025-01-06 VITALS
OXYGEN SATURATION: 98 % | HEIGHT: 71 IN | WEIGHT: 220 LBS | BODY MASS INDEX: 30.8 KG/M2 | DIASTOLIC BLOOD PRESSURE: 78 MMHG | SYSTOLIC BLOOD PRESSURE: 112 MMHG | TEMPERATURE: 98.4 F

## 2025-01-06 DIAGNOSIS — E78.5 HYPERLIPIDEMIA, UNSPECIFIED HYPERLIPIDEMIA TYPE: ICD-10-CM

## 2025-01-06 DIAGNOSIS — R73.03 PRE-DIABETES: Primary | ICD-10-CM

## 2025-01-06 PROCEDURE — 98005 SYNCH AUDIO-VIDEO EST LOW 20: CPT | Performed by: FAMILY MEDICINE

## 2025-01-06 NOTE — PROGRESS NOTES
Virtual Regular Visit  Name: Brian Garrison      : 1980      MRN: [id number]  Encounter Provider: Samantha Cornell MD  Encounter Date: 2025   Encounter department: [facility]      Verification of patient location:  Patient is located at Home in the following state in which I hold an active license [place] :  Assessment & Plan  Pre-diabetes  Patient tolerating Wegovy.  Increase to Wegovy 2.4  Recent 9 pound weight loss  Recommend 2-month weight check       Hyperlipidemia, unspecified hyperlipidemia type             Encounter provider Samantha Cornell MD    The patient was identified by name and date of birth. Brian Garrison was informed that this is a telemedicine visit and that the visit is being conducted through the Epic Embedded platform. He agrees to proceed..  My office door was closed. No one else was in the room.  He acknowledged consent and understanding of privacy and security of the video platform. The patient has agreed to participate and understands they can discontinue the visit at any time.    Patient is aware this is a billable service.     History of Present Illness     HPI  44-year-old male continues to be recovering from lateral epicondylitis surgery.  Patient states that he is tolerating 1.7 Wegovy without any concerns.  States that he is very pleased with his weight loss journey.  Denies any acute side effects and would like to continue his journey if possible.    Review of Systems   Constitutional:  Negative for activity change, appetite change, chills, fatigue and fever.   HENT:  Negative for congestion.    Respiratory:  Negative for cough, chest tightness and shortness of breath.    Cardiovascular:  Negative for chest pain and leg swelling.   Gastrointestinal:  Negative for abdominal distention, abdominal pain, constipation, diarrhea, nausea and vomiting.   Musculoskeletal:  Positive for arthralgias.   All other systems reviewed and are  "negative.      Objective   /78 (BP Location: Left arm, Patient Position: Sitting, Cuff Size: Standard)   Temp 98.4 °F (36.9 °C) (Temporal)   Ht 5' 11\" (1.803 m)   Wt 99.8 kg (220 lb)   SpO2 98%   BMI 30.68 kg/m²     Physical Exam  Constitutional:       Appearance: He is well-developed.   HENT:      Head: Normocephalic and atraumatic.   Eyes:      Conjunctiva/sclera: Conjunctivae normal.      Pupils: Pupils are equal, round, and reactive to light.   Pulmonary:      Effort: Pulmonary effort is normal.   Neurological:      Mental Status: He is alert and oriented to person, place, and time.   Psychiatric:         Behavior: Behavior normal.         Thought Content: Thought content normal.         Judgment: Judgment normal.         Visit Time  Total Visit Duration: 15  "

## 2025-01-21 ENCOUNTER — TELEPHONE (OUTPATIENT)
Dept: FAMILY MEDICINE CLINIC | Facility: CLINIC | Age: 45
End: 2025-01-21

## 2025-01-21 NOTE — TELEPHONE ENCOUNTER
Left patient a message advising PCP out of office today.  Forward message to Dr. Pederson to review.  We can send letter via [employer] once completed.        Good afternoon, Needing to get an updated letter please showing the following updated dates please.  December 19th, 2024 to February 10th, 2025.  Brian Garrison to [facility] (supporting You)         12/30/24  1:47 PM  Thank you Tierra!!!!!!!  Me to Brian Garrison         12/30/24 12:28 PM  Brian:     Attached please find updated paperwork.  Should you need additional information please do not hesitate to contact us.  Have a Happy and Healthy New Year!!     Dee   Attachments   Other    Last read by Brian Garrison at 4:47PM on 1/21/2025.  FMLA Paperwork and Letter    From  Brian Garrison To  [facility] (supporting Dee Montoya) Sent  1/21/2025  4:47 PM       Good afternoon, Needing to get an updated letter please showing the following updated dates please.  December 19th, 2024 to February 10th, 2025.

## 2025-01-31 DIAGNOSIS — R73.03 PRE-DIABETES: ICD-10-CM

## 2025-01-31 RX ORDER — SEMAGLUTIDE 2.4 MG/.75ML
INJECTION, SOLUTION SUBCUTANEOUS
Qty: 3 ML | Refills: 0 | Status: SHIPPED | OUTPATIENT
Start: 2025-01-31

## 2025-01-31 NOTE — TELEPHONE ENCOUNTER
Patient is at the pharmacy and refill is not there and would like this sent over now.        Reason for call:   [x] Refill   [] Prior Auth  [] Other:     Office:   [x] PCP/Provider - : Samantha Cornell/Naeem Mart  [] Specialty/Provider -     Medication: Wegovy    Dose/Frequency: 2.4 mg     Quantity: 3 ml    Pharmacy: [facility]    Does the patient have enough for 3 days?   [] Yes   [x] No - Send as HP to POD

## 2025-02-11 ENCOUNTER — OFFICE VISIT (OUTPATIENT)
Dept: FAMILY MEDICINE CLINIC | Facility: CLINIC | Age: 45
End: 2025-02-11
Payer: COMMERCIAL

## 2025-02-11 VITALS
BODY MASS INDEX: 30.8 KG/M2 | WEIGHT: 220 LBS | TEMPERATURE: 97 F | SYSTOLIC BLOOD PRESSURE: 100 MMHG | DIASTOLIC BLOOD PRESSURE: 80 MMHG | HEIGHT: 71 IN | OXYGEN SATURATION: 98 % | HEART RATE: 82 BPM | RESPIRATION RATE: 18 BRPM

## 2025-02-11 DIAGNOSIS — Z85.71 HISTORY OF HODGKIN'S LYMPHOMA: Primary | ICD-10-CM

## 2025-02-11 DIAGNOSIS — R21 RASH: ICD-10-CM

## 2025-02-11 DIAGNOSIS — R73.03 PRE-DIABETES: ICD-10-CM

## 2025-02-11 DIAGNOSIS — E78.5 HYPERLIPIDEMIA, UNSPECIFIED HYPERLIPIDEMIA TYPE: ICD-10-CM

## 2025-02-11 PROCEDURE — 99214 OFFICE O/P EST MOD 30 MIN: CPT | Performed by: FAMILY MEDICINE

## 2025-02-11 NOTE — PROGRESS NOTES
Brian Garrison 1980 male MRN: [id number]    [facility] OFFICE VISIT  [facility]      ASSESSMENT/PLAN  Brian Garrison is a 45 y.o. male presents to the office for    Diagnoses and all orders for this visit:    History of Hodgkin's lymphoma    Rash  -     CBC and differential; Future  -     Comprehensive metabolic panel; Future  -     Sedimentation rate, automated; Future  -     C-reactive protein; Future  -     Lyme Total AB W Reflex to IGM/IGG; Future  -     Babesia microti antibody, IgG & Igm; Future  -     DENISSE Screen w/Reflex Cascade; Future  -     CBC and differential  -     Comprehensive metabolic panel  -     Sedimentation rate, automated  -     C-reactive protein  -     Lyme Total AB W Reflex to IGM/IGG  -     Babesia microti antibody, IgG & Igm  -     DENISSE Screen w/Reflex Cascade    Pre-diabetes    Hyperlipidemia, unspecified hyperlipidemia type       Unfortunately patient does have erythemic blanchable rash on his back did FaceTime his wife during the appointment and she states that that is not something new very sensitive to touch.  Recommend stopping Wegovy with no wean to be sure that this is not a side effect.  Recommend blood work shown above.  And recommend contacting oncologist today advising of the rash that is developing on his trunk           No future appointments.       SUBJECTIVE  CC: nerve problem (Painful nerve issue in neck and back//Started 1.5 months ago)      HPI:  Brian Garrison is a 45 y.o. male who presents for an acute appointment. Shingles when he was in  left nipple to the left shoulder blade; 18 years old. Since then had fever blisters. Taking valtrex as needed.  30 y/o Started having itching of his ankle -> thought it was logging boot and thought it was just being in them that long. 33 Hodgkins lymphoma was started as itching  more then normal. CXR shown it and sent to the hospital.  Wegovy and recent right shoulder  December  "started with  \"nerves; laying on a bed of nails but not shapr pain. Chills with goosebump  Knows that this isn't normal but doesn't know why he feels like this  Blew a vein when getting chemo  Review of Systems  Please see above  Historical Information   The patient history was reviewed as follows:  Past Medical History:   Diagnosis Date   • Acute pansinusitis 1/9/2020   • Cancer (HCC) 2015    hodgkins lymphoma   • Headache(784.0) 12/18/2023   • History of appendectomy 5/9/2018   • Hodgkin's lymphoma (HCC)    • Sleep disorder     trouble falling asleep and staying asleep         Medications:     Current Outpatient Medications:   •  ALPRAZolam (XANAX) 0.5 mg tablet, Daily prn, Disp: 10 tablet, Rfl: 0  •  pantoprazole (PROTONIX) 40 mg tablet, Take 1 tablet (40 mg total) by mouth daily, Disp: 30 tablet, Rfl: 1  •  valACYclovir (VALTREX) 1,000 mg tablet, , Disp: , Rfl:     No Known Allergies    OBJECTIVE  Vitals:   Vitals:    02/11/25 1537   BP: 100/80   BP Location: Left arm   Patient Position: Sitting   Cuff Size: Large   Pulse: 82   Resp: 18   Temp: (!) 97 °F (36.1 °C)   TempSrc: Temporal   SpO2: 98%   Weight: 99.8 kg (220 lb)   Height: 5' 11\" (1.803 m)         Physical Exam  Constitutional:       Appearance: Normal appearance.   Pulmonary:      Effort: Pulmonary effort is normal.   Musculoskeletal:         General: Normal range of motion.   Skin:     Findings: Rash present.      Comments: Significant sensitivity of the back with a blanchable rash diffusely   Neurological:      General: No focal deficit present.      Mental Status: He is alert and oriented to person, place, and time.   Psychiatric:         Mood and Affect: Mood normal.         Behavior: Behavior normal.         Thought Content: Thought content normal.         Judgment: Judgment normal.                    Samantha Cornell MD,   [facility]  2/11/2025      " 98.3

## 2025-02-14 LAB — B BURGDOR IGG+IGM SER QL IA: NEGATIVE

## 2025-02-17 LAB
ALBUMIN SERPL-MCNC: 4.8 G/DL (ref 4.1–5.1)
ALP SERPL-CCNC: 87 IU/L (ref 44–121)
ALT SERPL-CCNC: 27 IU/L (ref 0–44)
ANA TITR SER IF: NEGATIVE {TITER}
AST SERPL-CCNC: 20 IU/L (ref 0–40)
B MICROTI IGG TITR SER: NORMAL {TITER}
B MICROTI IGM TITR SER: NORMAL {TITER}
BASOPHILS # BLD AUTO: 0 X10E3/UL (ref 0–0.2)
BASOPHILS NFR BLD AUTO: 1 %
BILIRUB SERPL-MCNC: 0.5 MG/DL (ref 0–1.2)
BUN SERPL-MCNC: 15 MG/DL (ref 6–24)
BUN/CREAT SERPL: 15 (ref 9–20)
CALCIUM SERPL-MCNC: 10.4 MG/DL (ref 8.7–10.2)
CHLORIDE SERPL-SCNC: 102 MMOL/L (ref 96–106)
CO2 SERPL-SCNC: 24 MMOL/L (ref 20–29)
CREAT SERPL-MCNC: 1.03 MG/DL (ref 0.76–1.27)
CRP SERPL-MCNC: 1 MG/L (ref 0–10)
EGFR: 90 ML/MIN/1.73
EOSINOPHIL # BLD AUTO: 0.3 X10E3/UL (ref 0–0.4)
EOSINOPHIL NFR BLD AUTO: 5 %
ERYTHROCYTE [DISTWIDTH] IN BLOOD BY AUTOMATED COUNT: 12.4 % (ref 11.6–15.4)
ERYTHROCYTE [SEDIMENTATION RATE] IN BLOOD BY WESTERGREN METHOD: 2 MM/HR (ref 0–15)
GLOBULIN SER-MCNC: 2.5 G/DL (ref 1.5–4.5)
GLUCOSE SERPL-MCNC: 74 MG/DL (ref 70–99)
HCT VFR BLD AUTO: 46.7 % (ref 37.5–51)
HGB BLD-MCNC: 15.6 G/DL (ref 13–17.7)
IMM GRANULOCYTES # BLD: 0 X10E3/UL (ref 0–0.1)
IMM GRANULOCYTES NFR BLD: 0 %
LABORATORY COMMENT REPORT: NORMAL
LYMPHOCYTES # BLD AUTO: 1.7 X10E3/UL (ref 0.7–3.1)
LYMPHOCYTES NFR BLD AUTO: 27 %
MCH RBC QN AUTO: 29.1 PG (ref 26.6–33)
MCHC RBC AUTO-ENTMCNC: 33.4 G/DL (ref 31.5–35.7)
MCV RBC AUTO: 87 FL (ref 79–97)
MONOCYTES # BLD AUTO: 0.4 X10E3/UL (ref 0.1–0.9)
MONOCYTES NFR BLD AUTO: 6 %
NEUTROPHILS # BLD AUTO: 3.9 X10E3/UL (ref 1.4–7)
NEUTROPHILS NFR BLD AUTO: 61 %
PLATELET # BLD AUTO: 354 X10E3/UL (ref 150–450)
POTASSIUM SERPL-SCNC: 4.8 MMOL/L (ref 3.5–5.2)
PROT SERPL-MCNC: 7.3 G/DL (ref 6–8.5)
RBC # BLD AUTO: 5.36 X10E6/UL (ref 4.14–5.8)
RESULT/COMMENT: NORMAL
SODIUM SERPL-SCNC: 143 MMOL/L (ref 134–144)
WBC # BLD AUTO: 6.4 X10E3/UL (ref 3.4–10.8)

## 2025-02-20 DIAGNOSIS — R21 RASH: Primary | ICD-10-CM

## 2025-02-21 NOTE — PROGRESS NOTES
Spoke to the patient in detail.  Already scheduled for hematology he states that they sent in for CAT scans.  Patient states that he is concerned that they will not find anything.  Continues to have the sensitivity in the back

## 2025-04-30 ENCOUNTER — TELEPHONE (OUTPATIENT)
Dept: DERMATOLOGY | Facility: CLINIC | Age: 45
End: 2025-04-30

## 2025-04-30 NOTE — TELEPHONE ENCOUNTER
Called patient from referral [facility]. LMOM that our office received a referral from his Dr's to be treated for Rash if you are still interested in scheduling an appointment with  Dermatology please give the office a call at [phone]. (Routine Referral).

## 2025-06-05 ENCOUNTER — OFFICE VISIT (OUTPATIENT)
Dept: FAMILY MEDICINE CLINIC | Facility: CLINIC | Age: 45
End: 2025-06-05
Payer: COMMERCIAL

## 2025-06-05 VITALS
OXYGEN SATURATION: 98 % | TEMPERATURE: 97.2 F | HEART RATE: 67 BPM | SYSTOLIC BLOOD PRESSURE: 118 MMHG | BODY MASS INDEX: 32.34 KG/M2 | HEIGHT: 71 IN | RESPIRATION RATE: 18 BRPM | WEIGHT: 231 LBS | DIASTOLIC BLOOD PRESSURE: 80 MMHG

## 2025-06-05 DIAGNOSIS — E78.5 HYPERLIPIDEMIA, UNSPECIFIED HYPERLIPIDEMIA TYPE: ICD-10-CM

## 2025-06-05 DIAGNOSIS — Z92.3 HISTORY OF RADIATION THERAPY: ICD-10-CM

## 2025-06-05 DIAGNOSIS — F41.9 ANXIETY: ICD-10-CM

## 2025-06-05 DIAGNOSIS — R73.03 PRE-DIABETES: ICD-10-CM

## 2025-06-05 DIAGNOSIS — D49.89 THYMOMA: ICD-10-CM

## 2025-06-05 DIAGNOSIS — Z85.71 HISTORY OF HODGKIN'S LYMPHOMA: ICD-10-CM

## 2025-06-05 PROCEDURE — 99214 OFFICE O/P EST MOD 30 MIN: CPT | Performed by: FAMILY MEDICINE

## 2025-06-05 RX ORDER — TIRZEPATIDE 2.5 MG/.5ML
2.5 INJECTION, SOLUTION SUBCUTANEOUS WEEKLY
Qty: 2 ML | Refills: 0 | Status: SHIPPED | OUTPATIENT
Start: 2025-06-05 | End: 2025-07-03

## 2025-06-05 NOTE — PROGRESS NOTES
Name: Brian Garrison      : 1980      MRN: [id number]  Encounter Provider: Samantha Cornell MD  Encounter Date: 2025   Encounter department: [facility]    Assessment & Plan  BMI 32.0-32.9,adult  Patient did not have good side effects on Wegovy.  But he did do well with weight loss.  Recommend switching the Zepbound to see if there is left side effects  Orders:  •  tirzepatide (Zepbound) 2.5 mg/0.5 mL auto-injector; Inject 0.5 mL (2.5 mg total) under the skin once a week for 28 days    Thymoma  Recommend a CAT scan in 6 months to reevaluate it       History of Hodgkin's lymphoma    Orders:  •  tirzepatide (Zepbound) 2.5 mg/0.5 mL auto-injector; Inject 0.5 mL (2.5 mg total) under the skin once a week for 28 days    Pre-diabetes    Orders:  •  tirzepatide (Zepbound) 2.5 mg/0.5 mL auto-injector; Inject 0.5 mL (2.5 mg total) under the skin once a week for 28 days    Hyperlipidemia, unspecified hyperlipidemia type    Orders:  •  tirzepatide (Zepbound) 2.5 mg/0.5 mL auto-injector; Inject 0.5 mL (2.5 mg total) under the skin once a week for 28 days    History of radiation therapy         Anxiety  Currently controlled            History of Present Illness     HPI  Chief Complaint   Patient presents with   • Follow-up   45-year-old male presenting to the office.  Patient had a complete workup by the oncologist when he had a rash on his back.  He believes that it was secondary to the Wegovy.  Patient states that a thymoma was discovered and states that they are keeping an eye on it.  And can review repeating scans in the future.  Patient states that since being off Wegovy his weight did go backwards a little bit and would like to try another agent if possible.  Taking his Xanax as needed    Review of Systems   Constitutional:  Negative for activity change, appetite change, chills, fatigue and fever.   HENT:  Negative for congestion.    Respiratory:  Negative for cough, chest tightness and  "shortness of breath.    Cardiovascular:  Negative for chest pain and leg swelling.   Gastrointestinal:  Negative for abdominal distention, abdominal pain, constipation, diarrhea, nausea and vomiting.   All other systems reviewed and are negative.    Past Medical History[1]  Past Surgical History[2]  Family History[3]  Social History[4]  Medications[5]  No Known Allergies  Immunization History   Administered Date(s) Administered   • COVID-19 PFIZER VACCINE 0.3 ML IM 05/05/2021, 06/02/2021, 12/21/2021   • Influenza, seasonal, injectable, preservative free 11/15/2024     Objective   /80 (BP Location: Left arm, Patient Position: Sitting, Cuff Size: Large)   Pulse 67   Temp (!) 97.2 °F (36.2 °C) (Temporal)   Resp 18   Ht 5' 11\" (1.803 m)   Wt 105 kg (231 lb)   SpO2 98%   BMI 32.22 kg/m²     Physical Exam  Constitutional:       Appearance: He is well-developed.   HENT:      Head: Normocephalic and atraumatic.     Eyes:      Conjunctiva/sclera: Conjunctivae normal.      Pupils: Pupils are equal, round, and reactive to light.     Pulmonary:      Effort: Pulmonary effort is normal.     Neurological:      Mental Status: He is alert and oriented to person, place, and time.     Psychiatric:         Behavior: Behavior normal.         Thought Content: Thought content normal.         Judgment: Judgment normal.                [1]  Past Medical History:  Diagnosis Date   • Acute pansinusitis 1/9/2020   • Cancer (HCC) 2015    hodgkins lymphoma   • Headache(784.0) 12/18/2023   • History of appendectomy 5/9/2018   • Hodgkin's lymphoma (HCC)    • Sleep disorder     trouble falling asleep and staying asleep   [2]  Past Surgical History:  Procedure Laterality Date   • APPENDECTOMY     • [place] SURGICAL ARTHROSCOPY SHOULDER BICEPS TENODESIS Left 9/8/2020    Procedure: shoulder arthroscopy superior labral repair;  Surgeon: Yonas Bee MD;  Location: [facility];  Service: Orthopedics   • THORACOSCOPY VIDEO ASSISTED SURGERY " (VATS)     [3]  Family History  Adopted: Yes   [4]  Social History  Tobacco Use   • Smoking status: Former     Current packs/day: 0.00     Average packs/day: 0.5 packs/day for 15.0 years (7.5 ttl pk-yrs)     Types: Cigarettes     Start date: 1996     Quit date: 2011     Years since quittin.7   • Smokeless tobacco: Never   Vaping Use   • Vaping status: Never Used   Substance and Sexual Activity   • Alcohol use: Yes     Alcohol/week: 2.0 standard drinks of alcohol     Types: 2 Cans of beer per week     Comment: monthly   • Drug use: Yes     Types: Marijuana     Comment: rarely uses for pain   • Sexual activity: Yes   [5]  Current Outpatient Medications on File Prior to Visit   Medication Sig   • ALPRAZolam (XANAX) 0.5 mg tablet Daily prn   • pantoprazole (PROTONIX) 40 mg tablet Take 1 tablet (40 mg total) by mouth daily   • valACYclovir (VALTREX) 1,000 mg tablet

## 2025-06-05 NOTE — LETTER
June 5, 2025     Patient: Brian Garrison  YOB: 1980  Date of Visit: 6/5/2025      To Whom it May Concern:    Brian Garrison is under my professional care. Brian was seen in my office on 6/5/2025. Brian has a history of Hodgkin lymphoma s/p radiation 2015; currently being managed by PCP and Oncology. Given patient over decrease immune system, I recommend that patient is to work home, to reduce exposure rate of infections. However I am OK with onsite work as required through job requirements.  Patient is also being treated for Anxiety given history of cancer.     If you have any questions or concerns, please don't hesitate to call.         Sincerely,          Samantha Cornell MD        CC: No Recipients

## 2025-06-09 ENCOUNTER — TELEPHONE (OUTPATIENT)
Age: 45
End: 2025-06-09

## 2025-06-10 NOTE — TELEPHONE ENCOUNTER
PA Zepbound 2.5 MG/0.5ML APPROVED         Patient advised by          []MyChart Message  []Phone call   [x]LMOM  []L/M to call office as no active Communication consent on file  []Unable to leave detailed message as VM not approved on Communication consent       Pharmacy advised by    [x]Fax  []Phone call  []Secure Chat    Specialty Pharmacy    []

## 2025-06-10 NOTE — TELEPHONE ENCOUNTER
PA Zepbound 2.5 MG/0.5ML SUBMITTED    to [employer]     via    []CMM-KEY:    [x]Surescripts-Case ID # [id number]   []Availity-Auth ID #  NDC #    []Faxed to plan   []Other website    []Phone call Case ID #      []PA sent as URGENT    All office notes, labs and other pertaining documents and studies sent. Clinical questions answered. Awaiting determination from insurance company.     Turnaround time for your insurance to make a decision on your Prior Authorization can take 7-21 business days.

## 2025-07-08 RX ORDER — TIRZEPATIDE 5 MG/.5ML
5 INJECTION, SOLUTION SUBCUTANEOUS WEEKLY
Qty: 2 ML | Refills: 0 | Status: SHIPPED | OUTPATIENT
Start: 2025-07-08

## 2025-07-29 ENCOUNTER — TELEPHONE (OUTPATIENT)
Age: 45
End: 2025-07-29

## 2025-07-29 DIAGNOSIS — K21.9 GERD (GASTROESOPHAGEAL REFLUX DISEASE): ICD-10-CM

## 2025-07-29 RX ORDER — TIRZEPATIDE 5 MG/.5ML
5 INJECTION, SOLUTION SUBCUTANEOUS WEEKLY
Qty: 2 ML | Refills: 0 | Status: CANCELLED | OUTPATIENT
Start: 2025-07-29

## 2025-07-30 DIAGNOSIS — B88.2 TICK-BORNE DISEASE: Primary | ICD-10-CM

## 2025-07-30 DIAGNOSIS — R21 RASH: ICD-10-CM

## 2025-07-30 RX ORDER — TIRZEPATIDE 5 MG/.5ML
5 INJECTION, SOLUTION SUBCUTANEOUS WEEKLY
Qty: 6 ML | Refills: 0 | Status: SHIPPED | OUTPATIENT
Start: 2025-07-30

## 2025-07-30 RX ORDER — PANTOPRAZOLE SODIUM 40 MG/1
40 TABLET, DELAYED RELEASE ORAL DAILY
Qty: 30 TABLET | Refills: 0 | Status: SHIPPED | OUTPATIENT
Start: 2025-07-30

## 2025-08-02 LAB — B BURGDOR IGG+IGM SER QL IA: NEGATIVE

## 2025-08-07 ENCOUNTER — TELEPHONE (OUTPATIENT)
Age: 45
End: 2025-08-07

## 2025-08-07 DIAGNOSIS — B88.2 TICK-BORNE DISEASE: ICD-10-CM

## 2025-08-07 DIAGNOSIS — R21 RASH: Primary | ICD-10-CM

## 2025-08-07 RX ORDER — DOXYCYCLINE 100 MG/1
100 CAPSULE ORAL EVERY 12 HOURS SCHEDULED
Qty: 20 CAPSULE | Refills: 0 | Status: SHIPPED | OUTPATIENT
Start: 2025-08-07 | End: 2025-08-17

## (undated) DEVICE — TUBING ARTHROSCOPIC WAVE  MAIN PUMP

## (undated) DEVICE — SUT LABRALTAPE 1.5MM X 36IN

## (undated) DEVICE — TIBURON BEACH CHAIR SHOULDER DRAPE: Brand: CONVERTORS

## (undated) DEVICE — DUAL SPIKE ADAPTER: Brand: CONMED

## (undated) DEVICE — ASTOUND IMPERVIOUS SURGICAL GOWN: Brand: CONVERTORS

## (undated) DEVICE — OCCLUSIVE GAUZE STRIP,3% BISMUTH TRIBROMOPHENATE IN PETROLATUM BLEND: Brand: XEROFORM

## (undated) DEVICE — PACK ARTHROSCOPY

## (undated) DEVICE — SPONGE GAUZE 4 X 8 12 PLY STRL LF

## (undated) DEVICE — CANNULA 5.75 X 70MM BARREL SHAPED BOWL

## (undated) DEVICE — CHLORAPREP HI-LITE 26ML ORANGE

## (undated) DEVICE — BURR  OVAL 4MM 13CM 8 FLUTE COOLCUT

## (undated) DEVICE — GLOVE INDICATOR PI UNDERGLOVE SZ 7.5 BLUE

## (undated) DEVICE — GLOVE SRG BIOGEL 7.5

## (undated) DEVICE — INTENDED FOR TISSUE SEPARATION, AND OTHER PROCEDURES THAT REQUIRE A SHARP SURGICAL BLADE TO PUNCTURE OR CUT.: Brand: BARD-PARKER SAFETY BLADES SIZE 11, STERILE

## (undated) DEVICE — GLOVE SRG BIOGEL 6.5

## (undated) DEVICE — GLOVE INDICATOR PI UNDERGLOVE SZ 6.5 BLUE

## (undated) DEVICE — INSERTION KIT PERCUTANEOUS  F/2.9MM PUSHLOCK

## (undated) DEVICE — POSITIONER TRIMANO LIMB BEACH CHAIR

## (undated) DEVICE — LOOP SUT W/PASSER 25DEG CRV RT

## (undated) DEVICE — 3M™ TEGADERM™ TRANSPARENT FILM DRESSING FRAME STYLE, 1626W, 4 IN X 4-3/4 IN (10 CM X 12 CM), 50/CT 4CT/CASE: Brand: 3M™ TEGADERM™

## (undated) DEVICE — BLADE SHAVER TORPEDO 4MM 13CM  COOLCUT

## (undated) DEVICE — 3M™ TEGADERM™ TRANSPARENT FILM DRESSING FRAME STYLE, 1628, 6 IN X 8 IN (15 CM X 20 CM), 10/CT 8CT/CASE: Brand: 3M™ TEGADERM™

## (undated) DEVICE — TUBING SUCTION 5MM X 12 FT

## (undated) DEVICE — CANNULA 7 X70MM THRD SEAL SIDE PORT